# Patient Record
Sex: FEMALE | Race: WHITE | NOT HISPANIC OR LATINO | Employment: OTHER | URBAN - METROPOLITAN AREA
[De-identification: names, ages, dates, MRNs, and addresses within clinical notes are randomized per-mention and may not be internally consistent; named-entity substitution may affect disease eponyms.]

---

## 2017-02-09 LAB
EXT 24 HR UR METANEPHRINE: ABNORMAL
EXT 24 HR UR NORMETANEPHRINE: ABNORMAL
EXT 24 HR UR NORMETANEPHRINE: ABNORMAL
EXT 25 HYDROXY VIT D2: <4
EXT 25 HYDROXY VIT D3: ABNORMAL
EXT 5 HIAA 24 HR URINE: ABNORMAL
EXT 5 HIAA BLOOD: 25 NG/ML (ref 0–22)
EXT ACTH: ABNORMAL
EXT AFP: ABNORMAL
EXT ALBUMIN: ABNORMAL
EXT ALKALINE PHOSPHATASE: ABNORMAL
EXT ALT: ABNORMAL
EXT AMYLASE: ABNORMAL
EXT ANTI ISLET CELL AB: ABNORMAL
EXT ANTI PARIETAL CELL AB: ABNORMAL
EXT ANTI THYROID AB: ABNORMAL
EXT AST: ABNORMAL
EXT BILIRUBIN DIRECT: ABNORMAL MG/DL
EXT BILIRUBIN TOTAL: ABNORMAL
EXT BK VIRUS DNA QN PCR: ABNORMAL
EXT BUN: ABNORMAL
EXT C PEPTIDE: ABNORMAL
EXT CA 125: ABNORMAL
EXT CA 19-9: ABNORMAL
EXT CA 27-29: ABNORMAL
EXT CALCITONIN: ABNORMAL
EXT CALCIUM: ABNORMAL
EXT CEA: ABNORMAL
EXT CHLORIDE: ABNORMAL
EXT CHOLESTEROL: 240 (ref 140–210)
EXT CHROMOGRANIN A: 177 (ref 0–95)
EXT CO2: ABNORMAL
EXT CREATININE UA: ABNORMAL
EXT CREATININE: ABNORMAL MG/DL
EXT CYCLOSPORONE LEVEL: ABNORMAL
EXT DOPAMINE: ABNORMAL
EXT EBV DNA BY PCR: ABNORMAL
EXT EPINEPHRINE: ABNORMAL
EXT FOLATE: ABNORMAL
EXT FREE T3: ABNORMAL
EXT FREE T4: ABNORMAL
EXT FSH: ABNORMAL
EXT GASTRIN RELEASING PEPTIDE: ABNORMAL
EXT GASTRIN RELEASING PEPTIDE: ABNORMAL
EXT GASTRIN: ABNORMAL
EXT GGT: ABNORMAL
EXT GHRELIN: ABNORMAL
EXT GLUCAGON: ABNORMAL
EXT GLUCOSE: ABNORMAL
EXT GROWTH HORMONE: ABNORMAL
EXT HCV RNA QUANT PCR: ABNORMAL
EXT HDL: 62
EXT HEMATOCRIT: ABNORMAL
EXT HEMOGLOBIN A1C: ABNORMAL
EXT HEMOGLOBIN: ABNORMAL
EXT HISTAMINE 24 HR URINE: ABNORMAL
EXT HISTAMINE: ABNORMAL
EXT IGF-1: ABNORMAL
EXT IMMUNKNOW (NON-STIMULATED): ABNORMAL
EXT IMMUNKNOW (STIMULATED): ABNORMAL
EXT INR: ABNORMAL
EXT INSULIN: ABNORMAL
EXT LANREOTIDE LEVEL: ABNORMAL
EXT LDH, TOTAL: 204 (ref 81–234)
EXT LDL CHOLESTEROL: 151.8 (ref 0–100)
EXT LIPASE: ABNORMAL
EXT MAGNESIUM: 2.1 (ref 1.8–2.4)
EXT METANEPHRINE FREE PLASMA: ABNORMAL
EXT MOTILIN: ABNORMAL
EXT NEUROKININ A CAMB: ABNORMAL
EXT NEUROKININ A ISI: 20
EXT NEUROTENSIN: ABNORMAL
EXT NOREPINEPHRINE: ABNORMAL
EXT NORMETANEPHRINE: ABNORMAL
EXT NSE: ABNORMAL
EXT OCTREOTIDE LEVEL: ABNORMAL
EXT PANCREASTATIN CAMB: ABNORMAL
EXT PANCREASTATIN ISI: 70 (ref 0–88)
EXT PANCREATIC POLYPEPTIDE: ABNORMAL
EXT PHOSPHORUS: ABNORMAL
EXT PLATELETS: ABNORMAL
EXT POTASSIUM: ABNORMAL
EXT PROGRAF LEVEL: ABNORMAL
EXT PROLACTIN: ABNORMAL
EXT PROTEIN TOTAL: ABNORMAL
EXT PROTEIN UA: ABNORMAL
EXT PT: ABNORMAL
EXT PTH, INTACT: ABNORMAL
EXT PTT: ABNORMAL
EXT RAPAMUNE LEVEL: ABNORMAL
EXT SEROTONIN: 655 (ref 56–244)
EXT SODIUM: ABNORMAL MMOL/L
EXT SOMATOSTATIN: ABNORMAL
EXT SUBSTANCE P: ABNORMAL
EXT TRIGLYCERIDES: 131 (ref 35–160)
EXT TRYPTASE: ABNORMAL
EXT TSH: ABNORMAL
EXT URIC ACID: ABNORMAL
EXT URINE AMYLASE U/HR: ABNORMAL
EXT URINE AMYLASE U/L: ABNORMAL
EXT VASOACTIVE INTESTINAL POLYPEPTIDE: ABNORMAL
EXT VITAMIN B12: ABNORMAL
EXT VMA 24 HR URINE: ABNORMAL
EXT WBC: ABNORMAL
NEURON SPECIFIC ENOLASE: ABNORMAL

## 2017-04-03 LAB
EXT 24 HR UR METANEPHRINE: ABNORMAL
EXT 24 HR UR NORMETANEPHRINE: ABNORMAL
EXT 24 HR UR NORMETANEPHRINE: ABNORMAL
EXT 25 HYDROXY VIT D2: ABNORMAL
EXT 25 HYDROXY VIT D3: ABNORMAL
EXT 5 HIAA 24 HR URINE: ABNORMAL
EXT 5 HIAA BLOOD: ABNORMAL
EXT ACTH: ABNORMAL
EXT AFP: ABNORMAL
EXT ALBUMIN: 3.8 (ref 3.4–5)
EXT ALKALINE PHOSPHATASE: 82 (ref 46–116)
EXT ALT: 35 (ref 14–59)
EXT AMYLASE: ABNORMAL
EXT ANTI ISLET CELL AB: ABNORMAL
EXT ANTI PARIETAL CELL AB: ABNORMAL
EXT ANTI THYROID AB: ABNORMAL
EXT AST: 23 (ref 15–37)
EXT BILIRUBIN DIRECT: ABNORMAL MG/DL
EXT BILIRUBIN TOTAL: 0.3 (ref 0.2–1)
EXT BK VIRUS DNA QN PCR: ABNORMAL
EXT BUN: 13 (ref 7–18)
EXT C PEPTIDE: ABNORMAL
EXT CA 125: ABNORMAL
EXT CA 19-9: ABNORMAL
EXT CA 27-29: ABNORMAL
EXT CALCITONIN: ABNORMAL
EXT CALCIUM: 8.8 (ref 8.5–10.1)
EXT CEA: ABNORMAL
EXT CHLORIDE: 104 (ref 98–107)
EXT CHOLESTEROL: ABNORMAL
EXT CHROMOGRANIN A: ABNORMAL
EXT CO2: 27 (ref 21–32)
EXT CREATININE UA: ABNORMAL
EXT CREATININE: 1 (ref 0.6–1.3)
EXT CYCLOSPORONE LEVEL: ABNORMAL
EXT DOPAMINE: ABNORMAL
EXT EBV DNA BY PCR: ABNORMAL
EXT EPINEPHRINE: ABNORMAL
EXT FOLATE: ABNORMAL
EXT FREE T3: 33 (ref 22.5–37)
EXT FREE T4: 9.3 (ref 4.5–10.9)
EXT FSH: ABNORMAL
EXT GASTRIN RELEASING PEPTIDE: ABNORMAL
EXT GASTRIN RELEASING PEPTIDE: ABNORMAL
EXT GASTRIN: ABNORMAL
EXT GGT: ABNORMAL
EXT GHRELIN: ABNORMAL
EXT GLUCAGON: ABNORMAL
EXT GLUCOSE: 71 (ref 74–106)
EXT GROWTH HORMONE: ABNORMAL
EXT HCV RNA QUANT PCR: ABNORMAL
EXT HDL: ABNORMAL
EXT HEMATOCRIT: 39.5 (ref 37–47)
EXT HEMOGLOBIN A1C: ABNORMAL
EXT HEMOGLOBIN: 13.4 (ref 12–16)
EXT HISTAMINE 24 HR URINE: ABNORMAL
EXT HISTAMINE: ABNORMAL
EXT IGF-1: ABNORMAL
EXT IMMUNKNOW (NON-STIMULATED): ABNORMAL
EXT IMMUNKNOW (STIMULATED): ABNORMAL
EXT INR: ABNORMAL
EXT INSULIN: ABNORMAL
EXT LANREOTIDE LEVEL: ABNORMAL
EXT LDH, TOTAL: ABNORMAL
EXT LDL CHOLESTEROL: ABNORMAL
EXT LIPASE: ABNORMAL
EXT MAGNESIUM: ABNORMAL
EXT METANEPHRINE FREE PLASMA: ABNORMAL
EXT MOTILIN: ABNORMAL
EXT NEUROKININ A CAMB: ABNORMAL
EXT NEUROKININ A ISI: ABNORMAL
EXT NEUROTENSIN: ABNORMAL
EXT NOREPINEPHRINE: ABNORMAL
EXT NORMETANEPHRINE: ABNORMAL
EXT NSE: ABNORMAL
EXT OCTREOTIDE LEVEL: ABNORMAL
EXT PANCREASTATIN CAMB: ABNORMAL
EXT PANCREASTATIN ISI: ABNORMAL
EXT PANCREATIC POLYPEPTIDE: ABNORMAL
EXT PHOSPHORUS: ABNORMAL
EXT PLATELETS: 260 (ref 130–400)
EXT POTASSIUM: 3.8 (ref 3.5–5.1)
EXT PROGRAF LEVEL: ABNORMAL
EXT PROLACTIN: ABNORMAL
EXT PROTEIN TOTAL: 7.5 (ref 6.4–8.2)
EXT PROTEIN UA: ABNORMAL
EXT PT: ABNORMAL
EXT PTH, INTACT: ABNORMAL
EXT PTT: ABNORMAL
EXT RAPAMUNE LEVEL: ABNORMAL
EXT SEROTONIN: ABNORMAL
EXT SODIUM: 141 (ref 136–145)
EXT SOMATOSTATIN: ABNORMAL
EXT SUBSTANCE P: ABNORMAL
EXT TRIGLYCERIDES: ABNORMAL
EXT TRYPTASE: ABNORMAL
EXT TSH: 0.02 (ref 0.55–4.78)
EXT URIC ACID: ABNORMAL
EXT URINE AMYLASE U/HR: ABNORMAL
EXT URINE AMYLASE U/L: ABNORMAL
EXT VASOACTIVE INTESTINAL POLYPEPTIDE: ABNORMAL
EXT VITAMIN B12: 396 (ref 211–911)
EXT VMA 24 HR URINE: ABNORMAL
EXT WBC: 4.5 (ref 4.8–10.8)
NEURON SPECIFIC ENOLASE: ABNORMAL

## 2017-05-08 ENCOUNTER — TELEPHONE (OUTPATIENT)
Dept: NEUROLOGY | Facility: HOSPITAL | Age: 57
End: 2017-05-08

## 2017-05-08 DIAGNOSIS — C7A.012 MALIGNANT CARCINOID TUMOR OF THE ILEUM: Primary | ICD-10-CM

## 2017-05-08 RX ORDER — THYROID 90 MG/1
90 TABLET ORAL DAILY
COMMUNITY

## 2017-05-08 RX ORDER — PROGESTERONE 200 MG/1
400 CAPSULE ORAL NIGHTLY
COMMUNITY

## 2017-05-08 NOTE — TELEPHONE ENCOUNTER
----- Message from Juli Yates sent at 5/8/2017  9:52 AM CDT -----  Contact: Patient  EAW/New patient - Who called: patient    Referred by: Dr. Domingo    Why do you need to be seen? Neuroendocrine tumors- Liver    Which doctor are you requesting? Hal for surgery    You may contact patient back to schedule at:  [phone] ( Patient states Dr. Ramires office faxed information over last Thursday and [facility] will be mailing a package of the patients CDs and history)    Instructed patient to gather medical records, Cd's and medication list and fax or mail to us asap.

## 2017-05-08 NOTE — LETTER
May 8, 2017    Krysten Muhammad  [address]  [facility] [address]             [facility]  Tumor Program  [address]  [address]  [place] [address]  Phone: [phone]  Fax: [phone] Dear Ms. Muhammad:    Thank you for your interest in our program. It was my pleasure speaking with you today about your upcoming appointment.     You have a scheduled appointment with Dr CIARA Hong on Firday, May 26, 2017 at 12:30 PM. Our office is located at :    [facility]  Medical Office [facility]  Neuroendocrine Tumor Clinic  [address], [address]  [place], [address]    You are scheduled for a consultation only with the physicians unless otherwise planned. Plan to be here for your visit for 2-3 hrs. If flight arrangements are made, plan to make the return flight after 6 pm if possible. The [place] ([facility] is only 10 minutes from [facility].    In preparation for your appointment, we ask that you gather the information below and fax them to us ASAP. This will enable us to review all pertinent information at the time of your visit so that recommendations can be made and a plan of care developed for you.     Please return forms along with all paper records via fax asap.    Please fax  1. Insurance cards (front and back)-enlarged if possible  2. Drivers licenses  3. Current medicine list  4. Name, address & phone # of your physician for correspondence  5. Authorization for Release of Information-complete and return to clinic  6. Medical Records-send as soon as you have them together (see guidelines below)    Lab Work: If requested, the special lab markers do require special tubes that have to be ordered by the ordering facility. The lab work should be within 3 months.. The labs take 2-3 weeks to get results so please get labs drawn asap. The name and phone # of the send out lab that does the special labs tests is on the order. You can have the labs drawn at [facility], a local hospital, or your doctors office (whichever works). If these lab tests need to be done, I will attach an Outpatient Order form. If you are doing your lab work at a facility other than [facility], call our office to notify us the date you have them drawn and the location and phone number of the lab for easy follow up.    Scans: Please mail copies of CD's of your last scans to the office asap. I would recommend sending them overnight with a tracking number in case of any problems. If you need updated scans, I will attach an Outpatient Order form.    Tissue Biopsy/Pathology: If you had a tissue biopsy or surgery, we will request to have your slides and/or tissue blocks sent to us to perform some special testing on them. Please provide us with a pathology report asa. This testing will be billed to your insurance company.     Operative or Procedure reports: All surgery or procedure reports related to your neuroendocrine tumor should be sent to us.    Insurance Company: You should contact your insurance company to inquire about your insurance coverage and benefits. Ask about co-payments and deductibles when seeing a specialist. Ask if this visit will be in Network or Out of Network. We may be able to work with you if this is out of network for you.    Lodging: Attached is lodging information from the [email or web address] and a list of local hotels. The [facility] is run by the [employer] and is free of charge. [facility], you must call my office and talk to [facility]. You will need to complete the application and send it to my office for a physician signature. We will forward it to the [facility] and they will check availability. If you wish to stay at the [facility], apply EARLY, they fill up quickly. You may contact the [facility] a week later to confirm your reservation and ask any questions regarding the facility. You  May only stay at the [facility] 24 hrs prior to your appointment  and up to 24 hrs after your appointment. (THIS CAN ONLY BE USED IF YOU LIVE MORE THAN 40 MILES FROM OUR FACILITY).    Call me if you have any questions, email is not the best way to communicate with our office.    We are looking forward to meeting and taking care of you. If you have any questions or concerns, please don't hesitate to call.     Sincerely,        Kristel Quezada, RN, BSN  Nurse Manager, Neuroendocrine Tumor Program

## 2017-05-08 NOTE — TELEPHONE ENCOUNTER
----- Message from Juli Yates sent at 5/4/2017 12:03 PM CDT -----  Contact: New Patient  EAW/New patient - Who called:Patient    Referred by: Dr. Domingo    Why do you need to be seen?  Neuroendocrine Mid gut and Liver    Which doctor are you requesting? Hal    You may contact patient back to schedule at: [phone]    Instructed patient to gather medical records, Cd's and medication list and fax or mail to us asap.

## 2017-05-08 NOTE — TELEPHONE ENCOUNTER
Ordered tumor markers and path re read per protocol. Appointment made with MD, info sent to patient. Diagnosed with ileal and liver, had resections in 2012 and 2013 and now with recurrence in liver and wants to have surgery right away.

## 2017-05-08 NOTE — LETTER
May 8, 2017        Marc Domingo MD  [address]   [address]  [facility] [address]             [facility]  [address] [place] [address]  Phone: [phone]  Fax: [phone]   Patient: Krysten Muhammad   MR Number: [id number]   YOB: 1960   Date of Visit: 5/8/2017     Dear Dr. Domingo,     We contacted Ms. Muhammad  regarding setting up an appointment for an evaluation at our center.  We scheduled blood tumor markers and a pathology re read over the next couple of weeks.  We also scheduled an appointment with Dr. CIARA Hong on Friday, May 25, 2017 at 12:30 PM for recommendations.  We will forward you a copy of the clinic note after the visit.      Thank you for considering our program and for referring this patient.  If you have any questions, please do not hesitate to contact us.      Sincerely,      Graciela Mondragon RN

## 2017-05-09 LAB
EXT 24 HR UR METANEPHRINE: ABNORMAL
EXT 24 HR UR NORMETANEPHRINE: ABNORMAL
EXT 24 HR UR NORMETANEPHRINE: ABNORMAL
EXT 25 HYDROXY VIT D2: ABNORMAL
EXT 25 HYDROXY VIT D3: ABNORMAL
EXT 5 HIAA 24 HR URINE: ABNORMAL
EXT 5 HIAA BLOOD: ABNORMAL
EXT ACTH: ABNORMAL
EXT AFP: ABNORMAL
EXT ALBUMIN: ABNORMAL
EXT ALKALINE PHOSPHATASE: ABNORMAL
EXT ALT: ABNORMAL
EXT AMYLASE: ABNORMAL
EXT ANTI ISLET CELL AB: ABNORMAL
EXT ANTI PARIETAL CELL AB: ABNORMAL
EXT ANTI THYROID AB: ABNORMAL
EXT AST: ABNORMAL
EXT BILIRUBIN DIRECT: ABNORMAL MG/DL
EXT BILIRUBIN TOTAL: ABNORMAL
EXT BK VIRUS DNA QN PCR: ABNORMAL
EXT BUN: ABNORMAL
EXT C PEPTIDE: ABNORMAL
EXT CA 125: ABNORMAL
EXT CA 19-9: ABNORMAL
EXT CA 27-29: ABNORMAL
EXT CALCITONIN: ABNORMAL
EXT CALCIUM: ABNORMAL
EXT CEA: ABNORMAL
EXT CHLORIDE: ABNORMAL
EXT CHOLESTEROL: ABNORMAL
EXT CHROMOGRANIN A: 270 NG/ML (ref 0–95)
EXT CO2: ABNORMAL
EXT CREATININE UA: ABNORMAL
EXT CREATININE: ABNORMAL MG/DL
EXT CYCLOSPORONE LEVEL: ABNORMAL
EXT DOPAMINE: ABNORMAL
EXT EBV DNA BY PCR: ABNORMAL
EXT EPINEPHRINE: ABNORMAL
EXT FOLATE: ABNORMAL
EXT FREE T3: ABNORMAL
EXT FREE T4: ABNORMAL
EXT FSH: ABNORMAL
EXT GASTRIN RELEASING PEPTIDE: ABNORMAL
EXT GASTRIN RELEASING PEPTIDE: ABNORMAL
EXT GASTRIN: ABNORMAL
EXT GGT: ABNORMAL
EXT GHRELIN: ABNORMAL
EXT GLUCAGON: ABNORMAL
EXT GLUCOSE: ABNORMAL
EXT GROWTH HORMONE: ABNORMAL
EXT HCV RNA QUANT PCR: ABNORMAL
EXT HDL: ABNORMAL
EXT HEMATOCRIT: ABNORMAL
EXT HEMOGLOBIN A1C: ABNORMAL
EXT HEMOGLOBIN: ABNORMAL
EXT HISTAMINE 24 HR URINE: ABNORMAL
EXT HISTAMINE: ABNORMAL
EXT IGF-1: ABNORMAL
EXT IMMUNKNOW (NON-STIMULATED): ABNORMAL
EXT IMMUNKNOW (STIMULATED): ABNORMAL
EXT INR: ABNORMAL
EXT INSULIN: ABNORMAL
EXT LANREOTIDE LEVEL: ABNORMAL
EXT LDH, TOTAL: ABNORMAL
EXT LDL CHOLESTEROL: ABNORMAL
EXT LIPASE: ABNORMAL
EXT MAGNESIUM: ABNORMAL
EXT METANEPHRINE FREE PLASMA: ABNORMAL
EXT MOTILIN: ABNORMAL
EXT NEUROKININ A CAMB: ABNORMAL
EXT NEUROKININ A ISI: 15 PG/ML (ref 0–40)
EXT NEUROTENSIN: ABNORMAL
EXT NOREPINEPHRINE: ABNORMAL
EXT NORMETANEPHRINE: ABNORMAL
EXT NSE: ABNORMAL
EXT OCTREOTIDE LEVEL: ABNORMAL
EXT PANCREASTATIN CAMB: ABNORMAL
EXT PANCREASTATIN ISI: ABNORMAL
EXT PANCREATIC POLYPEPTIDE: ABNORMAL
EXT PHOSPHORUS: ABNORMAL
EXT PLATELETS: ABNORMAL
EXT POTASSIUM: ABNORMAL
EXT PROGRAF LEVEL: ABNORMAL
EXT PROLACTIN: ABNORMAL
EXT PROTEIN TOTAL: ABNORMAL
EXT PROTEIN UA: ABNORMAL
EXT PT: ABNORMAL
EXT PTH, INTACT: ABNORMAL
EXT PTT: ABNORMAL
EXT RAPAMUNE LEVEL: ABNORMAL
EXT SEROTONIN: 817 NG/ML (ref 56–244)
EXT SODIUM: ABNORMAL MMOL/L
EXT SOMATOSTATIN: ABNORMAL
EXT SUBSTANCE P: ABNORMAL
EXT TRIGLYCERIDES: ABNORMAL
EXT TRYPTASE: ABNORMAL
EXT TSH: ABNORMAL
EXT URIC ACID: ABNORMAL
EXT URINE AMYLASE U/HR: ABNORMAL
EXT URINE AMYLASE U/L: ABNORMAL
EXT VASOACTIVE INTESTINAL POLYPEPTIDE: ABNORMAL
EXT VITAMIN B12: ABNORMAL
EXT VMA 24 HR URINE: ABNORMAL
EXT WBC: ABNORMAL
NEURON SPECIFIC ENOLASE: ABNORMAL

## 2017-05-11 ENCOUNTER — TELEPHONE (OUTPATIENT)
Dept: NEUROLOGY | Facility: HOSPITAL | Age: 57
End: 2017-05-11

## 2017-05-11 LAB
EXT 24 HR UR METANEPHRINE: ABNORMAL
EXT 24 HR UR NORMETANEPHRINE: ABNORMAL
EXT 24 HR UR NORMETANEPHRINE: ABNORMAL
EXT 25 HYDROXY VIT D2: ABNORMAL
EXT 25 HYDROXY VIT D3: ABNORMAL
EXT 5 HIAA 24 HR URINE: ABNORMAL
EXT 5 HIAA BLOOD: 57 (ref 0–22)
EXT ACTH: ABNORMAL
EXT AFP: ABNORMAL
EXT ALBUMIN: ABNORMAL
EXT ALKALINE PHOSPHATASE: ABNORMAL
EXT ALT: ABNORMAL
EXT AMYLASE: ABNORMAL
EXT ANTI ISLET CELL AB: ABNORMAL
EXT ANTI PARIETAL CELL AB: ABNORMAL
EXT ANTI THYROID AB: ABNORMAL
EXT AST: ABNORMAL
EXT BILIRUBIN DIRECT: ABNORMAL MG/DL
EXT BILIRUBIN TOTAL: ABNORMAL
EXT BK VIRUS DNA QN PCR: ABNORMAL
EXT BUN: ABNORMAL
EXT C PEPTIDE: ABNORMAL
EXT CA 125: ABNORMAL
EXT CA 19-9: ABNORMAL
EXT CA 27-29: ABNORMAL
EXT CALCITONIN: ABNORMAL
EXT CALCIUM: ABNORMAL
EXT CEA: ABNORMAL
EXT CHLORIDE: ABNORMAL
EXT CHOLESTEROL: ABNORMAL
EXT CHROMOGRANIN A: ABNORMAL
EXT CO2: ABNORMAL
EXT CREATININE UA: ABNORMAL
EXT CREATININE: ABNORMAL MG/DL
EXT CYCLOSPORONE LEVEL: ABNORMAL
EXT DOPAMINE: ABNORMAL
EXT EBV DNA BY PCR: ABNORMAL
EXT EPINEPHRINE: ABNORMAL
EXT FOLATE: ABNORMAL
EXT FREE T3: ABNORMAL
EXT FREE T4: ABNORMAL
EXT FSH: ABNORMAL
EXT GASTRIN RELEASING PEPTIDE: ABNORMAL
EXT GASTRIN RELEASING PEPTIDE: ABNORMAL
EXT GASTRIN: ABNORMAL
EXT GGT: ABNORMAL
EXT GHRELIN: ABNORMAL
EXT GLUCAGON: ABNORMAL
EXT GLUCOSE: ABNORMAL
EXT GROWTH HORMONE: ABNORMAL
EXT HCV RNA QUANT PCR: ABNORMAL
EXT HDL: ABNORMAL
EXT HEMATOCRIT: ABNORMAL
EXT HEMOGLOBIN A1C: ABNORMAL
EXT HEMOGLOBIN: ABNORMAL
EXT HISTAMINE 24 HR URINE: ABNORMAL
EXT HISTAMINE: ABNORMAL
EXT IGF-1: ABNORMAL
EXT IMMUNKNOW (NON-STIMULATED): ABNORMAL
EXT IMMUNKNOW (STIMULATED): ABNORMAL
EXT INR: ABNORMAL
EXT INSULIN: ABNORMAL
EXT LANREOTIDE LEVEL: ABNORMAL
EXT LDH, TOTAL: ABNORMAL
EXT LDL CHOLESTEROL: ABNORMAL
EXT LIPASE: ABNORMAL
EXT MAGNESIUM: ABNORMAL
EXT METANEPHRINE FREE PLASMA: ABNORMAL
EXT MOTILIN: ABNORMAL
EXT NEUROKININ A CAMB: ABNORMAL
EXT NEUROKININ A ISI: ABNORMAL
EXT NEUROTENSIN: ABNORMAL
EXT NOREPINEPHRINE: ABNORMAL
EXT NORMETANEPHRINE: ABNORMAL
EXT NSE: ABNORMAL
EXT OCTREOTIDE LEVEL: ABNORMAL
EXT PANCREASTATIN CAMB: ABNORMAL
EXT PANCREASTATIN ISI: ABNORMAL
EXT PANCREATIC POLYPEPTIDE: ABNORMAL
EXT PHOSPHORUS: ABNORMAL
EXT PLATELETS: ABNORMAL
EXT POTASSIUM: ABNORMAL
EXT PROGRAF LEVEL: ABNORMAL
EXT PROLACTIN: ABNORMAL
EXT PROTEIN TOTAL: ABNORMAL
EXT PROTEIN UA: ABNORMAL
EXT PT: ABNORMAL
EXT PTH, INTACT: ABNORMAL
EXT PTT: ABNORMAL
EXT RAPAMUNE LEVEL: ABNORMAL
EXT SEROTONIN: ABNORMAL
EXT SODIUM: ABNORMAL MMOL/L
EXT SOMATOSTATIN: ABNORMAL
EXT SUBSTANCE P: ABNORMAL
EXT TRIGLYCERIDES: ABNORMAL
EXT TRYPTASE: ABNORMAL
EXT TSH: ABNORMAL
EXT URIC ACID: ABNORMAL
EXT URINE AMYLASE U/HR: ABNORMAL
EXT URINE AMYLASE U/L: ABNORMAL
EXT VASOACTIVE INTESTINAL POLYPEPTIDE: ABNORMAL
EXT VITAMIN B12: ABNORMAL
EXT VMA 24 HR URINE: ABNORMAL
EXT WBC: ABNORMAL
NEURON SPECIFIC ENOLASE: ABNORMAL

## 2017-05-11 NOTE — TELEPHONE ENCOUNTER
Returned call to patient and she would like Dr Hong to review her films and try to determine if she might be a surgical candidate before her appointments on May 29 and May 30 so that she can book her daughter's flight form [place] because it is cheaper when they are booked earlier.  Will relay the massage to Dr Hong to review.

## 2017-05-11 NOTE — TELEPHONE ENCOUNTER
----- Message from Juli Yates sent at 5/11/2017  3:27 PM CDT -----  Contact: Patient  EAW/NEW- Patient is calling to make sure you received all scans needed. Also she would like to be placed on a wait list  to get in sooner with the doctors. Patient can be reached at [phone].

## 2017-05-15 ENCOUNTER — TELEPHONE (OUTPATIENT)
Dept: NEUROLOGY | Facility: HOSPITAL | Age: 57
End: 2017-05-15

## 2017-05-17 ENCOUNTER — TELEPHONE (OUTPATIENT)
Dept: NEUROLOGY | Facility: HOSPITAL | Age: 57
End: 2017-05-17

## 2017-05-17 DIAGNOSIS — C7B.02 SECONDARY MALIGNANT CARCINOID TUMOR OF LIVER: Primary | ICD-10-CM

## 2017-05-17 NOTE — TELEPHONE ENCOUNTER
Returned pts calll to inform that Dr. Hong has not yet reviewed her scans, but we will have him review as soon as he possibly can and return call. Pt verbalizes understanding.

## 2017-05-17 NOTE — TELEPHONE ENCOUNTER
----- Message from Naye Rivas sent at 5/15/2017 12:10 PM CDT -----  Contact: Patient  JPB- patient called regarding a fax to be seen by Dr. Hong. Patient would like to speak to Kim. Patients call back number [phone].

## 2017-05-17 NOTE — TELEPHONE ENCOUNTER
----- Message from Naye Rivas sent at 5/17/2017 10:46 AM CDT -----  Contact: Patient  JPB- Patient called regarding her surgery she would like to confirm that the surgery is a go. Patient would like to speak to Kim regarding this appointmen.

## 2017-05-18 NOTE — TELEPHONE ENCOUNTER
Phoned patient after Dr Hong viewed her films and said that surgery is on for Monday, June 19, 2017.  Will email her a screen shot of her CT scan time and anesthesia preop time.

## 2017-05-26 ENCOUNTER — LAB VISIT (OUTPATIENT)
Dept: LAB | Facility: HOSPITAL | Age: 57
End: 2017-05-26
Attending: SURGERY
Payer: MEDICARE

## 2017-05-26 DIAGNOSIS — C7A.012 MALIGNANT CARCINOID TUMOR OF THE ILEUM: ICD-10-CM

## 2017-05-26 PROCEDURE — 88360 TUMOR IMMUNOHISTOCHEM/MANUAL: CPT

## 2017-05-26 PROCEDURE — 88323 CONSLTJ&REPRT MATRL PREP SLD: CPT

## 2017-05-26 PROCEDURE — 88323 CONSLTJ&REPRT MATRL PREP SLD: CPT | Mod: 26,,,

## 2017-05-26 PROCEDURE — 88360 TUMOR IMMUNOHISTOCHEM/MANUAL: CPT | Mod: 26,59,,

## 2017-05-29 ENCOUNTER — HOSPITAL ENCOUNTER (OUTPATIENT)
Dept: CARDIOLOGY | Facility: HOSPITAL | Age: 57
Discharge: HOME | End: 2017-05-29
Attending: SURGERY
Payer: MEDICARE

## 2017-05-29 ENCOUNTER — HOSPITAL ENCOUNTER (OUTPATIENT)
Dept: RADIOLOGY | Facility: HOSPITAL | Age: 57
Discharge: HOME | End: 2017-05-29
Attending: SURGERY
Payer: MEDICARE

## 2017-05-29 ENCOUNTER — OFFICE VISIT (OUTPATIENT)
Dept: NEUROLOGY | Facility: HOSPITAL | Age: 57
End: 2017-05-29
Attending: SURGERY
Payer: MEDICARE

## 2017-05-29 VITALS
BODY MASS INDEX: 27.46 KG/M2 | WEIGHT: 155 LBS | TEMPERATURE: 97 F | HEART RATE: 69 BPM | SYSTOLIC BLOOD PRESSURE: 105 MMHG | DIASTOLIC BLOOD PRESSURE: 71 MMHG | HEIGHT: 63 IN

## 2017-05-29 DIAGNOSIS — C7B.02 METASTATIC MALIGNANT CARCINOID TUMOR TO LIVER: ICD-10-CM

## 2017-05-29 DIAGNOSIS — C7B.01 SECONDARY CARCINOID TUMOR OF DISTANT LYMPH NODES: ICD-10-CM

## 2017-05-29 DIAGNOSIS — C7A.012 MALIGNANT CARCINOID TUMOR OF THE ILEUM: ICD-10-CM

## 2017-05-29 DIAGNOSIS — C7B.02 SECONDARY MALIGNANT CARCINOID TUMOR OF LIVER: ICD-10-CM

## 2017-05-29 LAB
DIASTOLIC DYSFUNCTION: NO
MITRAL VALVE MOBILITY: NORMAL
RETIRED EF AND QEF - SEE NOTES: 65 (ref 55–65)

## 2017-05-29 PROCEDURE — 25500020 PHARM REV CODE 255: Performed by: SURGERY

## 2017-05-29 PROCEDURE — 93306 TTE W/DOPPLER COMPLETE: CPT | Mod: 26,,, | Performed by: INTERNAL MEDICINE

## 2017-05-29 PROCEDURE — 74177 CT ABD & PELVIS W/CONTRAST: CPT | Mod: TC

## 2017-05-29 PROCEDURE — 93306 TTE W/DOPPLER COMPLETE: CPT

## 2017-05-29 PROCEDURE — 74177 CT ABD & PELVIS W/CONTRAST: CPT | Mod: 26,,, | Performed by: RADIOLOGY

## 2017-05-29 RX ORDER — MIRABEGRON 50 MG/1
TABLET, FILM COATED, EXTENDED RELEASE ORAL
COMMUNITY
Start: 2017-05-24

## 2017-05-29 RX ORDER — ESTRADIOL 1 MG/1
1 TABLET ORAL DAILY
COMMUNITY

## 2017-05-29 RX ADMIN — IOHEXOL 30 ML: 350 INJECTION, SOLUTION INTRAVENOUS at 01:05

## 2017-05-29 RX ADMIN — IOHEXOL 75 ML: 350 INJECTION, SOLUTION INTRAVENOUS at 03:05

## 2017-05-29 NOTE — LETTER
May 29, 2017        Marc Domingo MD  [address]   [address]  [facility] [address]       NOLANETS: [facility] Neuroendocrine Tumor [facility]  A collaboration between [facility] and [facility] [address]  [address]  [place] [address]  Phone: [phone]  Fax: [phone]        CIARA Hong M.D., FACS  Kun Alcazar M.D.  Jordan Pizano M.D.   Cailin Denson M.D., FACS  DO Thad Maher M.D., FACS   Patient: Krysten Muhammad   MR Number: [id number]   YOB: 1960   Date of Visit: 5/29/2017     Dear Dr. Domingo    Thank you for the kind referral of Krysten Muhammad. We saw this patient on 5/29/2017, and a copy of our clinic note is enclosed. We certainly appreciate the opportunity to participate in your patient's care.     If you have any questions or wish to discuss your patient further, please do not hesitate to contact us at [phone].       Kindest personal regards,          Thad White MD, FACS  The [employer] Professor of Surgery & Neurosciences  [facility], Dept. Of Surgery  [address] [place] [address], [address]  [place] [address] [phone]

## 2017-05-29 NOTE — PATIENT INSTRUCTIONS
See Dr Hong tomorrow regarding surgery options at 10:30 AM    Labs every 3 months-orders given to patient. If surgery is done then do them 2 months after surgery and get us the results    CT, MRI prior to returning to clinic-call [phone] to schedule, if surgery is done then have them done 3 months after surgery to be re-staged    Echocardiogram yearly    Return to clinic in 6 months-if surgery is done then see Dr White 3 months after surgery

## 2017-05-29 NOTE — PROGRESS NOTES
"NOLANETS:  [facility] Neuroendocrine Tumor Special[facility]  A collaboration between [facility] and [facility]    PATIENT: Krysten Muhmamad  MRN: [id number]  DATE: 5/29/2017    Vitals:    05/29/17 0820   BP: 105/71   Pulse: 69   Temp: 97.4 °F (36.3 °C)   TempSrc: Oral   Weight: 70.3 kg (155 lb)   Height: 5' 3" (1.6 m)      Last 2 Weight Measurements:   Wt Readings from Last 2 Encounters:   05/29/17 70.3 kg (155 lb)     BMI: Body mass index is 27.46 kg/m².    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 10/2012    Secondary neuroendocrine tumor of distant lymph nodes 2012       Past Surgical History:   Procedure Laterality Date    breast reduction  and implants  2012    SBR, liver r/s, gall bladder  07/2013    Ki 67- 2%    SBR, lymph node r/s  10/2012    found carcinoid with colon obstruction       Review of patient's allergies indicates:   Allergen Reactions    Ciprofloxacin Other (See Comments)     Nausea and joint pain      Dilaudid [hydromorphone (bulk)] Nausea Only    Epinephrine Other (See Comments)     Carcinoid patient    Levaquin [levofloxacin] Other (See Comments)     Nausea and joint pain    Morphine Nausea And Vomiting    Ciprofloxacin hcl     Hydromorphone Nausea Only    Quinolones        Current Outpatient Prescriptions   Medication Sig Dispense Refill    " estradiol (ESTRACE) 1 MG tablet Take 1 mg by mouth once daily.      progesterone (PROMETRIUM) 200 MG capsule Take 200 mg by mouth every evening.      thyroid, pork, (ARMOUR THYROID) 90 mg Tab Take 90 mg by mouth once daily.      MYRBETRIQ 50 mg Tb24        No current facility-administered medications for this visit.        Review of Systems     Number of Days per Week Number per Day   DIARRHEA 0    BRISTOL STOOL SCALE RATING     FLUSHING 7 10--last for < 1 minute   WHEEZING 0      WEIGHT GAIN/LOSS 155=+10 over normal   ENERGY RATING (0-10) 10      Physical Exam deferred.             Pathology Data:        Laboratory Data:  Neuroendocrine Labs Latest Ref Rng & Units 5/9/2017 4/3/2017 2/9/2017   EXT 5 HIAA BLOOD 0 - 22 ng/ml   25 (A)   EXT SEROTONIN 56.0 - 244.0 ng/ml 817 (A)  655 (A)   EXT CHROMOGRANIN A 0.0 - 95.0 ng/ml 270 (A)  177 (A)   EXT PANCREASTATIN JERE 0.0 - 88.0   70   EXT NEUROKININ A JERE 0 - 40 pg/ml 15  20         Radiology Data:                        Impression:  1. Liver mets from Ileal NET with jose elias mets and liver mets-- ki 67 is about 4%  2.  hx of hypothyroidism       Plan: see LAB and JPB tomorrow about possible resection of liver mets            needs echocardiogram and ct today       Labs: Markers: 12 month intervals            Other: 6 month intervals    Scans: 6 month intervals    Return to Clinic:6 month intervals    Orders placed this visit: No orders of the defined types were placed in this encounter.       25 Minutes Face-to-Face; Counseling/Coordination of Care > 50 percent of Visit     Thad White MD, FACS  The [employer] Professor of Surgery, [facility] Neuroendocrine Tumor Specialists  [address], [address]  [place]  [address]  Cell [phone]  ph. [phone]; [phone]  fax. [phone]  [email or web address]

## 2017-05-30 ENCOUNTER — CLINICAL SUPPORT (OUTPATIENT)
Dept: NEUROLOGY | Facility: HOSPITAL | Age: 57
End: 2017-05-30
Payer: MEDICARE

## 2017-05-30 ENCOUNTER — OFFICE VISIT (OUTPATIENT)
Dept: NEUROLOGY | Facility: HOSPITAL | Age: 57
End: 2017-05-30
Attending: SURGERY
Payer: MEDICARE

## 2017-05-30 VITALS
HEART RATE: 67 BPM | BODY MASS INDEX: 27.46 KG/M2 | WEIGHT: 155 LBS | TEMPERATURE: 98 F | DIASTOLIC BLOOD PRESSURE: 81 MMHG | HEIGHT: 63 IN | SYSTOLIC BLOOD PRESSURE: 117 MMHG

## 2017-05-30 DIAGNOSIS — C7B.01 SECONDARY CARCINOID TUMOR OF DISTANT LYMPH NODES: ICD-10-CM

## 2017-05-30 DIAGNOSIS — C7B.02 METASTATIC MALIGNANT CARCINOID TUMOR TO LIVER: Primary | ICD-10-CM

## 2017-05-30 PROCEDURE — 99212 OFFICE O/P EST SF 10 MIN: CPT | Mod: 27

## 2017-05-30 NOTE — PROGRESS NOTES
"NOLANETS:  [facility] Neuroendocrine Tumor Special[facility]  A collaboration between [facility] and [facility]      PATIENT: Krysten Muhammad  MRN: [id number]  DATE: 5/30/2017    Subjective:      Chief Complaint: Consult (liver debulking consult for surgery)      Vitals:   Vitals:    05/30/17 1005   BP: 117/81   Pulse: 67   Temp: 97.5 °F (36.4 °C)   TempSrc: Oral   Weight: 70.3 kg (155 lb)   Height: 5' 3" (1.6 m)        Karnofsky Score:     Diagnosis:   1. 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 Secondary carcinoid tumor of distant lymph nodes         Oncologic History: s/p TI carcinoid 2012/2013.      Interval History: Ms. 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 10/2012    Secondary neuroendocrine tumor of distant lymph nodes 2012       Past Surgical History:  Past Surgical History:   Procedure Laterality Date    breast reduction  and implants  2012    SBR, liver r/s, gall bladder  07/2013    Ki 67- 2%    SBR, lymph node r/s  10/2012    found carcinoid with colon obstruction       Family History:  Family History   Problem Relation Age of Onset    Carcinoid Cancer Mother        Allergies:  Review of patient's allergies indicates:   Allergen Reactions    Ciprofloxacin Other (See Comments)     Nausea and joint pain      Dilaudid [hydromorphone (bulk)] Nausea Only    Epinephrine Other (See Comments)     Carcinoid patient    Levaquin [levofloxacin] Other (See Comments)     Nausea and joint pain    Morphine Nausea And Vomiting    Ciprofloxacin hcl     Hydromorphone Nausea Only    Quinolones        Medications:  Current " Outpatient Prescriptions   Medication Sig Dispense Refill    estradiol (ESTRACE) 1 MG tablet Take 1 mg by mouth once daily.      MYRBETRIQ 50 mg Tb24       progesterone (PROMETRIUM) 200 MG capsule Take 200 mg by mouth every evening.      thyroid, pork, (ARMOUR THYROID) 90 mg Tab Take 90 mg by mouth once daily.       No current facility-administered medications for this visit.        Review of Systems   Constitutional: Positive for fatigue. Negative for activity change, appetite change, chills, fever and unexpected weight change.   HENT: Negative.  Negative for congestion, ear pain, rhinorrhea and sinus pressure.    Eyes: Negative.  Negative for photophobia, pain and redness.   Respiratory: Negative.  Negative for cough, chest tightness, shortness of breath and wheezing.    Cardiovascular: Negative for chest pain, palpitations and leg swelling.   Gastrointestinal: Negative.  Negative for abdominal distention, abdominal pain, anal bleeding, blood in stool, constipation, diarrhea, nausea, rectal pain and vomiting.   Endocrine: Negative.  Negative for cold intolerance, heat intolerance, polydipsia, polyphagia and polyuria.   Genitourinary: Negative.  Negative for difficulty urinating, dysuria and frequency.   Musculoskeletal: Negative.  Negative for arthralgias, back pain, gait problem, myalgias, neck pain and neck stiffness.   Skin: Positive for color change (flushing). Negative for pallor and rash.   Allergic/Immunologic: Negative.  Negative for environmental allergies, food allergies and immunocompromised state.   Neurological: Negative.  Negative for dizziness, seizures, syncope, weakness and light-headedness.   Hematological: Negative.  Negative for adenopathy. Does not bruise/bleed easily.   Psychiatric/Behavioral: Negative.  Negative for agitation, behavioral problems, confusion, decreased concentration, dysphoric mood, hallucinations, self-injury, sleep disturbance and suicidal ideas. The patient is not  nervous/anxious and is not hyperactive.       Objective:      Physical Exam   Constitutional: She is oriented to person, place, and time. She appears well-developed and well-nourished. No distress.   HENT:   Head: Normocephalic and atraumatic.   Mouth/Throat: Oropharynx is clear and moist.   Eyes: EOM are normal. Pupils are equal, round, and reactive to light. No scleral icterus.   Neck: Normal range of motion. Neck supple. No thyromegaly present.   Cardiovascular: Normal rate, regular rhythm, normal heart sounds and intact distal pulses.  Exam reveals no gallop.    No murmur heard.  Pulmonary/Chest: Effort normal and breath sounds normal. No respiratory distress. She has no wheezes. She has no rales.   Abdominal: Soft. Bowel sounds are normal. She exhibits no distension and no mass. There is no tenderness. There is no rebound and no guarding. No hernia. Hernia confirmed negative in the ventral area.   Well healed scar   Musculoskeletal: Normal range of motion. She exhibits no edema or tenderness.   Lymphadenopathy:        Head (right side): No submandibular adenopathy present.        Head (left side): No submandibular adenopathy present.     She has no cervical adenopathy.        Right cervical: No superficial cervical adenopathy present.       Left cervical: No superficial cervical adenopathy present.        Right axillary: No pectoral and no lateral adenopathy present.        Left axillary: No pectoral and no lateral adenopathy present.       Right: No inguinal and no supraclavicular adenopathy present.        Left: No inguinal and no supraclavicular adenopathy present.   Neurological: She is alert and oriented to person, place, and time. She has normal reflexes. No cranial nerve deficit.   Skin: Skin is warm, dry and intact. No rash noted. She is not diaphoretic. No erythema. No pallor.   Psychiatric: She has a normal mood and affect. Her behavior is normal. Thought content normal.   Nursing note and vitals  reviewed.     Assessment:       1. 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 Secondary carcinoid tumor of distant lymph nodes        Laboratory Data:    Neuroendocrine Labs Latest Ref Rng & Units 5/30/2017 5/29/2017 5/9/2017 4/3/2017 2/9/2017   EXT 5 HIAA BLOOD 0 - 22 ng/ml - - - - 25(A)   EXT SEROTONIN 56.0 - 244.0 ng/ml - - 817(A) - 655(A)   EXT CHROMOGRANIN A 0.0 - 95.0 ng/ml - - 270(A) - 177(A)   EXT PANCREASTATIN JERE 0.0 - 88.0 - - - - 70   EXT NEUROKININ A JERE 0 - 40 pg/ml - - 15 - 20   EXT VITAMIN B12 211.0 - 911.0 - - - 396 -   EXT FREE T4 4.5 - 10.9 - - - 9.3 -   EXT FREE T3 22.5 - 37.0 - - - 33.0 -   EXT TSH 0.55 - 4.78 - - - 0.023(A) -   EXT 25 HYDROXY VIT D2 - - - - - <4.0   EXT WBC 4.8 - 10.8 - - - 4.5(A) -   EXT HGB 12.0 - 16.0 - - - 13.4 -   EXT HCT 37.0 - 47.0 - - - 39.5 -   EXT PLATLETS 130.0 - 400.0 - - - 260 -   EXT GLUCOSE 74.0 - 106.0 - - - 71(A) -   EXT BUN 7.0 - 18.0 - - - 13 -   EXT CREATININE 0.6 - 1.3 - - - 1.0 -   EXT .0 - 145.0 - - - 141 -   EXT K 3.5 - 5.1 - - - 3.8 -   EXT CHLORIDE 98.0 - 107.0 - - - 104 -   EXT CO2 21.0 - 32.0 - - - 27 -   EXT CALCIUM 8.5 - 10.1 - - - 8.8 -   EXT PROTEIN, TOTAL 6.4 - 8.2 - - - 7.5 -   EXT ALBUMIN 3.4 - 5.0 - - - 3.8 -   EXT TOTAL BILIRUBIN 0.2 - 1.0 - - - 0.3 -   EXT ALK PHOSPHATASE 46.0 - 116.0 - - - 82 -   EXT SGOT (AST) 15.0 - 37.0 - - - 23 -   EXT ALT 14.0 - 59.0 - - - 35 -   EXT LDH 81.0 - 234.0 - - - - 204.0   EXT TRIGLYCERIDES 35.0 - 160.0 - - - - 131   EXT CHOLESETEROL 140.0 - 210.0 - - - - 240(A)   EXT HDL - - - - - 62   EXT LDL 0.0 - 100.0 - - - - 151.8(A)   EXT MG 1.8 - 2.4 - - - - 2.10   Weight - 155 lbs 155 lbs - - -         Impression: Familial carcinoid syndrome with liver mets  Possible lesion in spleen.  History of intolerance to higher dose Sandostatin and lanreotide.  Not on therapy at present. liver metastases show progression  Plan:        recommend low-dose lanreotide 90 mg every 6 weeks to assess  tolerability.  Scans reviewed and discussed with patient  Multiple questions  Would like tissue saved for outside studies  looks resectable  1 hour counseling     tentative or 6/19/17          CIARA Hong MD, FACS  Professor of Surgery, [facility]  Neuroendocrine Surgery, Hepatic/Pancreatic & General Surgery  [address]  [place]  [address]  ph. [phone]; [phone]  fax. [phone]

## 2017-05-30 NOTE — PATIENT INSTRUCTIONS
Patient Instructions            Take a Hibiclens shower twice a day for 3 days prior to surgery, including the morning of surgery.   Gargle with Listerine twice a day for 2 days prior to surgery, including the morning of surgery.     6/16/2017     Appointment with Dr. Hong    Pre [place] for Hospital Admission - Go to 1st floor of the hospital-admitting desk. You will do paper work, get blood drawn,  get x-rays and see Anesthesia at this time.     6/18/2017   CLEAR LIQUIDS all day   Start bowel prep  Ducolax Laxative tablets - 2 tablets at 12 noon  Magnesium Citrate - 1 bottle at 2 pm   Do not eat or drink anything after midnight.                 6/19/2017   Hospital Admission for surgery.  Report to 2nd floor, Same Day Surgery desk at _7:00am   Surgery is scheduled for 9:00am        [facility] [address]  [place]  [id number]  [phone]      INFORMATION REGARDING YOUR PROCEDURE      We look forward to serving you and your family and appreciate that you have chosen [facility] for your healthcare needs. Before, during, and after your surgery, you will be cared for by skilled medical professionals. Our surgeons, anesthesiologists, nurses,  and other healthcare professionals will work with you and your family to ensure a safe, smooth and comfortable surgery and recovery.    In order to best meet your pre-admission needs, your surgeon or ordering physicians office will contact our Scheduling Office at [phone] and schedule a Pre-Procedure Appointment.  This should preferably be done 72 hours or greater before your scheduled procedure date.     During your pre-procedure visit your insurance will be verified for your procedure. You will meet with a Registered Nurse and an Anesthesiologist or Nurse Anesthetist. If tests are required, they will be performed during your visit. Please allow about one and a half hours for this visit.      You will need to bring  the following information or items with you to your Pre-Procedure Appointment:    1.  Picture Identification  2.  Insurance Card  3.  Current list of medications to include name and dosage  4.  List of allergies  5.  Orders and any other forms your doctor has given to you  6.  Copies of lab results performed at other facilities. This may include blood work, EKGs or chest xrays.   These results can be faxed to [phone].    The Pre-Op Center Registration Desk is located on the first floor of the hospital ([address] [place] [address]) in the [facility].  The Pre-Operative Center is located on the second floor of the hospital. Someone will walk you from the registration area to the Pre-Operative Center.    Free parking is located in the front of the hospital and medical office building and is easily accessed from [address] and [address]. When you arrive, please check in at the main information desk of the hospital.    Please call Outpatient Surgery at [phone] after 12:00pm on the day before your procedure for your arrival time and updated procedure time.      Pre-Op Bathing Instructions    Before surgery, you can play an important role in your own health.    Because skin is not sterile, we need to be sure that your skin is as free of germs as possible. By following the instructions below, you can reduce the number of germs on your skin before surgery.    IMPORTANT: You will need to shower with a special soap called Hibiclens*, available at any pharmacy.  If you are allergic to Chlorhexidine (the antiseptic in Hibiclens), use an antibacterial soap such as Dial Soap for your preoperative shower.  You will shower with Hibiclens both the night before your surgery and the morning of your surgery.  Do not use Hibiclens on the head, face or genitals to avoid injury to those areas.    STEP #1: THE NIGHT BEFORE YOUR SURGERY     1. Do not shave the area of your body where your surgery will be  performed.  2. Shower and wash your hair and body as usual with your normal soap and shampoo.  3. Rinse your hair and body thoroughly after you shower to remove all soap residue.  4. With your hand, apply one packet of Hibiclens soap to the surgical site.   5. Wash the site gently for five (5) minutes. Do not scrub your skin too hard.   6. Do not wash with your regular soap after Hibiclens is used.  7. Rinse your body thoroughly.  8. Pat yourself dry with a clean, soft towel.  9. Do not use lotion, cream, or powder.  10. Wear clean clothes.    STEP #2: THE MORNING OF YOUR SURGERY     1. Repeat Step #1.    * Not to be used by people allergic to Chlorhexidine.

## 2017-05-31 NOTE — PATIENT INSTRUCTIONS
MyPlate Worksheet: 2,200 Calories  Your calorie needs are about 2,000 calories a day. Below are the U.S. Department of Agriculture (USDA) guidelines for your daily recommended amount of each food group.  Vegetables  2½ cups Fruits  2 cups Grains  8 ounces Dairy  3 cups Protein  8  ounces   Eat a variety of vegetables. Cook soft and  Avoid peels and skins.  each day.  Aim for these amounts each week:  · 1½ cups dark green vegetables  · 5½ cups red or orange- colored vegetables  · 1½ cups dry beans and peas  · 5 cups starchy vegetables  · 4 cups other vegetables Eat a variety of fruits each day.  Dilute  fruit juices with water.  Fruits should be peeled or skinned. Cooked or canned may be best.  Good choices of fruits include:  · Berries  · Bananas  · Apples  · Melon  · Frozen fruit  · Canned fruit in their own juice avoid Heavy  syrup  Choose.  Aim to eat at least 3 ounces of  each day:  · Bread  · Cereal  · Rice  · Pasta  · Potatoes  · Tortillas Choose low-fat or fat-free milk, yogurt, or cheese each day.  Good choices include:  · Low-fat or fat-free milk or chocolate milk  · Low-fat or fat-free yogurt  · Low-fat or fat-free cottage cheese or other reduced-fat cheeses  · Calcium-fortified milk alternatives Choose low-fat or lean meats, poultry, fish and seafood each day.  Vary your protein, choose more:  · Fish and other seafood  · Lean low-fat meat and poultry  · Eggs  ·   · Tofu  ·   Choose less high-fat and red meat.   Source: USDA MyPlate, www.choosemyplate.gov  Know your limits on oils (fats) and sugars:  · Your allowance for oils is 6 teaspoons a day (oil includes vegetable oil, mayonnaise, soft margarine, salad dressing, nuts, olives, avocados, and some fish).  · Limit the extras (solid fats and sugars, also called empty calories) to 260 calories a day.  · Cut back on salt (sodium). Stay under 2,300 mg sodium a day. If you have a health condition such as heart disease or high blood pressure, your doctor  will likely tell you to limit sodium to no more than 1,500 mg a day.  Get moving and be active!  Aim for at least 30 minutes of physical activity most days of the week or 150 minutes of exercise a week.  MyPlate Servings Worksheet: 2,000 Calories  This worksheet tells you how many servings you should get each day from each food group, and tells you how much food makes a serving. Use this as a guide as you plan your meals throughout the day. Track your progress daily by writing in what you actually ate.  Food Group Daily MyPlate Goal What You Ate Today   Vegetables 5 Half-cups or 5 Servings  One serving is:  ½ cupor cooked vegetables    ½ baked  potato  ½ cup vegetable juice       Fruits 4 Half-cups or 4 Servings  One serving is:  ½ cup fresh, frozen, or canned fruit  1 medium piece of fruit with out peel  1 cup of berries or melon    ½ cup 100% fruit juice diluted with water between meals   Note: Make most choices fruit instead of juice.     Grains 6 Servings or 6 Ounces  One serving is:  1 slice bread  1 cup dry cereal  ½ cup cooked rice, pasta, or cereal  1 5-inch tortilla  1/2 cup potatoes no skin       Dairy 3 Servings or 3 Cups  One serving is:  1 cup milk  1½ ounces reduced-fat hard cheese  2 ounces processed cheese  1 cup low-fat yogurt  1/3 cup shredded cheese  Note: Choose low-fat or fat-free most often.     Protein 5½ Servings or 5½ Ounces  One serving is:  1 ounce cooked lean beef, pork, lamb, or ham  1 ounce cooked chicken or turkey (no skin)  1 ounce cooked fish or shellfish (not fried)  1 egg  ¼ cup egg substitute  ½ ounce nuts or seeds  1 tablespoon peanut or almond butter    ½ cup tofu  2 tablespoons hummus  If flushing avoid aged meat, cheese or nut butters.   Preventing Diarrhea due to Rapid Transit Time    *Symptoms of dumping syndrome include: feeling weak, dizzy and/or flushed within 30 minutes of eating. Cramping, pain, nausea, diarrhea and/or sweating may also occur.     Tips to avoid diarrhea  related to rapid transit and dumping syndrome:   Eat 4-6 small meals throughout the day   Try to eat a source of protein at each meal (such as: poultry, red meat, eggs, tofu, milk, yogurt, or cheese)   Limit concentrated sugars like candies, cookies, soda, juice, and syrup   Drink fluids 30-60 minutes before and after meals and snacks, but not during meals   Choose foods with soluble fibers (such as: oats, quinoa, fruits and vegetables without peels, and legumes)   Avoid extreme hot or cold foods and beverages   Eat slowly and rest for 15 minutes after a meal with feet up    Food Group Foods Allowed Foods to Avoid   Beverages  6-8 cups daily (no ice) Decaffeinated coffee and tea. Sugar free beverages, water drinks without sugar, water Caffeinated coffee or tea. Beverages made with sugar, corn syrup, or honey. Fruit juices and fruit drinks. Carbonated drinks.    Starches/Grains  8-12 servings daily Complex carbohydrates (such as: white bread, oatmeal, quinoa, cereal, potatoes, barley, white rice, and pasta).   High soluble fiber Sweet rolls, donuts, pastries, and cakes. Sugar cereals. Whole wheat, rye, pumpernickel brown rice and whole grains.  Low insoluble fiber   Meat and Other Protein Foods  Limit red meat to 12 ounces weekly Tender, well-cooked meats, poultry, and fish. Egg whites (whole eggs if tolerated). Soy foods prepared without added fat. Smooth peanut butter allowed if tolerable of fats. Powdered peanut butter (PB2) Fried meat, poultry, or fish. Luncheon meats (i.e. Bologna or salami). Sausage, hot dogs, menard. Tough or chewy meats. Dried beans and peas. (i.e. Rossi or kidney beans). Seeds or nuts.   Fats  1-3 tsp per meal A small to moderate amount of fat as tolerated: MCT oil, coconut oil, olive oil, canola, butter, margarine, cream, and cream cheese. High amounts of fat such as fried foods, avocados, olives, butter, vegetable oils, fried foods.   Fruits  3-6 servings daily Canned in fruit juice,  light syrup and drained. Fresh fruit without the peel. Diluted fruit 1 part to 3 water Canned fruit in sugar or syrup. Fruit juice. Dried fruits including prunes and raisins.   Vegetables  3-6 servings daily Fresh, frozen or canned vegetables cooked to a soft consistency. Raw vegetables (unless finely chopped).   Milk or Milk Foods  2-3 servings daily Milk (buttermilk, skim, 1% fat, and soy milk with no added sugar). Plain yogurt with no added sugar. Lactose free products. Cheese. Low-fat sugar ice cream. Whole milk, chocolate milk, half and half, regular ice cream, or creamers.   Miscellaneous Any allowed foods made with artificial sweeteners including: saccharin (Sweet 'N Low), sucralose (Splenda), and acesulfame potassium (Sunette, SweetOne), Stevia. Sugar, honey, syrup, jelly. Any sugar alcohol (such as: sorbitol, isomalt, hydrogenated starch hydrosylat or mannitol or xylitol).  Foods that list sugar, honey, syrup, xylitol, or sorbitol as one of the first three ingredients on the food label.     Source: http://applications.[employer].org/education/document.aspx?url=Patient+Education%5Ja97200.pdf

## 2017-06-02 ENCOUNTER — TELEPHONE (OUTPATIENT)
Dept: NEUROLOGY | Facility: HOSPITAL | Age: 57
End: 2017-06-02

## 2017-06-02 NOTE — TELEPHONE ENCOUNTER
----- Message from Naye Rivas sent at 6/2/2017  9:42 AM CDT -----  Contact: Patient   JPB/NEW- Patient would like to speak to a nurse  regarding her inscision. Patients call back number [phone]

## 2017-06-02 NOTE — TELEPHONE ENCOUNTER
"Received call back from pt. Discussed with pt that we do not have a plastic surgeon on staff to close her incision after surgery, however Dr. Hong has over 30 years of experience and is very well qualified to neatly close her incision. Pt prefers that Dr. Hong close her incision as opposed to a resident due to "not wanting a lumpy incision". Advised pt that she can discuss this with Dr. Hong at her pre-op appointment, but he is more than happy to accommodate this. Pt verbalizes understanding.   "

## 2017-06-06 ENCOUNTER — TELEPHONE (OUTPATIENT)
Dept: NEUROLOGY | Facility: HOSPITAL | Age: 57
End: 2017-06-06

## 2017-06-06 NOTE — TELEPHONE ENCOUNTER
Spoke to Dr Hong about lab results and phoned patient back. Dr Hong said it would not hurt for her to get B-12 injections this week and next for the low normal value of 185 and informed patient that the folate level is not in the panel and would have to have been ordered separately.  Instructed that she can have done by her local Dr if she wishes but she refused.

## 2017-06-06 NOTE — TELEPHONE ENCOUNTER
----- Message from Soraya Fuchs sent at 6/5/2017 11:20 AM CDT -----  EAW- Patient is calling because Dr. White ordered a B-12 panel but she only see one test marked on her order. Also, her result of the B-12 was 185 and it is really low, should she get a B-12 shot before she comes. Please call back to assist.

## 2017-06-06 NOTE — TELEPHONE ENCOUNTER
----- Message from Naye Rivas sent at 6/6/2017  2:21 PM CDT -----  Contact: patient  EAW- Patient is inquiring about her lab work including the folate value is not in her my [facility]. Can you mercy call lab and inquire. Patients call back [phone]. Also patient would like to know if she needs to get vit b12 shots back at home because her level is low.

## 2017-06-13 ENCOUNTER — TELEPHONE (OUTPATIENT)
Dept: NEUROLOGY | Facility: HOSPITAL | Age: 57
End: 2017-06-13

## 2017-06-13 NOTE — TELEPHONE ENCOUNTER
----- Message from Juli Yates sent at 6/13/2017 11:58 AM CDT -----  Contact: Patient  EAW- Patient would like a call back in regards to the Gallium scan from [facility] not being able to be viewed. Patient states Dr. Hollingsworth is trying to get with [facility] to correct this issue. Patient can be reached at [phone].

## 2017-06-13 NOTE — TELEPHONE ENCOUNTER
Spoke with pt regarding [facility] Ga scan images not loading.  She wants to get Dr. Armstrong at the [facility] in touch with the Rad IT person here to try and trouble shoot as to why the [facility] images are not loading.  Informed that Dr. May will not be able to help, he should be directed to Criselda in the Radiology dept for guidance.  She will reach out to her tomorrow.  Also informed her that this is not a quick fix and it will not be fix by Friday, regardless, her surgery is scheduled for MOnday as planned.  She verbalized understanding.

## 2017-06-13 NOTE — TELEPHONE ENCOUNTER
----- Message from Carrie Hart sent at 6/13/2017 11:50 AM CDT -----  Contact: Self  Pt is calling to speak with Ms. Damien.  Pt says Dr. Atkinson requested pt contact her about the gallium scans from the [facility].  She is trying to reach Dr. Lucian Hollingsworth about it.    She can be reached at [phone].    Thank you.

## 2017-06-16 ENCOUNTER — OFFICE VISIT (OUTPATIENT)
Dept: NEUROLOGY | Facility: HOSPITAL | Age: 57
DRG: 406 | End: 2017-06-16
Attending: SURGERY
Payer: MEDICARE

## 2017-06-16 ENCOUNTER — ANESTHESIA EVENT (OUTPATIENT)
Dept: SURGERY | Facility: HOSPITAL | Age: 57
DRG: 406 | End: 2017-06-16
Payer: MEDICARE

## 2017-06-16 ENCOUNTER — HOSPITAL ENCOUNTER (OUTPATIENT)
Dept: PREADMISSION TESTING | Facility: HOSPITAL | Age: 57
Discharge: HOME | DRG: 406 | End: 2017-06-16
Attending: SURGERY
Payer: MEDICARE

## 2017-06-16 ENCOUNTER — CLINICAL SUPPORT (OUTPATIENT)
Dept: LAB | Facility: HOSPITAL | Age: 57
DRG: 406 | End: 2017-06-16
Attending: SURGERY
Payer: MEDICARE

## 2017-06-16 VITALS
WEIGHT: 151.81 LBS | RESPIRATION RATE: 17 BRPM | DIASTOLIC BLOOD PRESSURE: 67 MMHG | BODY MASS INDEX: 26.9 KG/M2 | SYSTOLIC BLOOD PRESSURE: 114 MMHG | HEIGHT: 63 IN | OXYGEN SATURATION: 97 %

## 2017-06-16 VITALS
DIASTOLIC BLOOD PRESSURE: 67 MMHG | WEIGHT: 151.81 LBS | BODY MASS INDEX: 26.9 KG/M2 | HEIGHT: 63 IN | SYSTOLIC BLOOD PRESSURE: 114 MMHG | HEART RATE: 67 BPM | TEMPERATURE: 97 F

## 2017-06-16 DIAGNOSIS — Z01.818 PRE-OP TESTING: Primary | ICD-10-CM

## 2017-06-16 DIAGNOSIS — Z01.818 PRE-OP TESTING: ICD-10-CM

## 2017-06-16 DIAGNOSIS — C7B.02 METASTATIC MALIGNANT CARCINOID TUMOR TO LIVER: Primary | ICD-10-CM

## 2017-06-16 DIAGNOSIS — C7A.012 MALIGNANT CARCINOID TUMOR OF THE ILEUM: ICD-10-CM

## 2017-06-16 DIAGNOSIS — Z00.6 RESEARCH STUDY PATIENT: ICD-10-CM

## 2017-06-16 RX ORDER — ACETAMINOPHEN 10 MG/ML
1000 INJECTION, SOLUTION INTRAVENOUS EVERY 8 HOURS
Status: CANCELLED | OUTPATIENT
Start: 2017-06-16 | End: 2017-06-17

## 2017-06-16 RX ORDER — KETOROLAC TROMETHAMINE 15 MG/ML
15 INJECTION, SOLUTION INTRAMUSCULAR; INTRAVENOUS
Status: CANCELLED | OUTPATIENT
Start: 2017-06-16 | End: 2017-06-19

## 2017-06-16 RX ORDER — FAMOTIDINE 10 MG/ML
20 INJECTION INTRAVENOUS
Status: CANCELLED | OUTPATIENT
Start: 2017-06-16

## 2017-06-16 RX ORDER — PREGABALIN 75 MG/1
75 CAPSULE ORAL
Status: CANCELLED | OUTPATIENT
Start: 2017-06-16

## 2017-06-16 RX ORDER — LIDOCAINE HYDROCHLORIDE 10 MG/ML
1 INJECTION, SOLUTION EPIDURAL; INFILTRATION; INTRACAUDAL; PERINEURAL ONCE
Status: CANCELLED | OUTPATIENT
Start: 2017-06-16 | End: 2017-06-16

## 2017-06-16 RX ORDER — ONDANSETRON 2 MG/ML
8 INJECTION INTRAMUSCULAR; INTRAVENOUS
Status: CANCELLED | OUTPATIENT
Start: 2017-06-16

## 2017-06-16 RX ORDER — ERYTHROMYCIN 500 MG/1
TABLET, COATED ORAL
Qty: 6 TABLET | Refills: 0 | Status: SHIPPED | OUTPATIENT
Start: 2017-06-16 | End: 2017-06-27

## 2017-06-16 RX ORDER — TROSPIUM CHLORIDE 20 MG/1
TABLET, FILM COATED ORAL
COMMUNITY
Start: 2017-05-30 | End: 2017-09-27

## 2017-06-16 RX ORDER — ENOXAPARIN SODIUM 100 MG/ML
30 INJECTION SUBCUTANEOUS
Status: CANCELLED | OUTPATIENT
Start: 2017-06-16

## 2017-06-16 RX ORDER — NEOMYCIN SULFATE 500 MG/1
TABLET ORAL
Qty: 6 TABLET | Refills: 0 | Status: SHIPPED | OUTPATIENT
Start: 2017-06-16 | End: 2017-06-27

## 2017-06-16 RX ORDER — SODIUM CHLORIDE, SODIUM LACTATE, POTASSIUM CHLORIDE, CALCIUM CHLORIDE 600; 310; 30; 20 MG/100ML; MG/100ML; MG/100ML; MG/100ML
INJECTION, SOLUTION INTRAVENOUS CONTINUOUS
Status: CANCELLED | OUTPATIENT
Start: 2017-06-16

## 2017-06-16 NOTE — ANESTHESIA PREPROCEDURE EVALUATION
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    Past Surgical History:   Procedure Laterality Date    breast reduction  and implants Bilateral 2012    EAR MASTOIDECTOMY W/ COCHLEAR IMPLANT W/ LANDMARK Right 2014    SBR, liver r/s, gall bladder  07/2013    Ki 67- 2%    SBR, lymph node r/s  10/2012    found carcinoid with colon obstruction    URETHRAL SLING  2012        Anesthesia Evaluation    I have reviewed the Patient Summary Reports.    I have reviewed the Nursing Notes.   I have reviewed the Medications.     Review of Systems  Anesthesia Hx:  Hx of Anesthetic complications Pt states she feels that PONV r/t to sensitivity to post-op pain medication History of prior surgery of interest to airway management or planning: Previous anesthesia: General, MAC  colonoscopy, EGD with MAC.  Procedure performed at an [facility]. Personal Hx of Anesthesia complications, Post-Operative Nausea/Vomiting, in the past, but not with recent anesthetics / prophylaxis   Social:  Former Smoker, Alcohol Use    Hematology/Oncology:  Hematology Normal      Current/Recent Cancer. (carcinoid of ileum with mets to liver) surgery and radiation   EENT/Dental:  EENT/Dental Normal  Ears General/Symptom(s) Hearing Impairment: S/P Trochlear implant - right    Cardiovascular:   Exercise tolerance: good Denies Hypertension.  Denies Dysrhythmias.   Denies Angina.        Pulmonary:   Denies Shortness of breath.    Renal/:  Renal/ Normal     Hepatic/GI:   GERD, well controlled Liver Disease, (NET with mets) Chronic diarrhea    Neurological:  Neurology Normal    Endocrine:   Hypothyroidism        Physical Exam  General:  Well nourished    Airway/Jaw/Neck:  Airway Findings: Mouth Opening: Small, but > 3cm  Tongue: Normal  General Airway Assessment: Adult  Mallampati: II  TM Distance: Normal, at least 6 cm        Eyes/Ears/Nose:  EYES/EARS/NOSE FINDINGS: Normal   Dental:  Dental Findings: In tact   Chest/Lungs:  Chest/Lungs Clear    Heart/Vascular:  Heart Findings: Normal Heart murmur: negative    Abdomen:  Abdomen Findings: Normal      Mental Status:  Mental Status Findings:  Alert and Oriented, Cooperative, Anxious       Lab Results   Component Value Date    WBC 3.93 06/16/2017    HGB 12.4 06/16/2017    HCT 36.8 (L) 06/16/2017    MCV 96 06/16/2017     06/16/2017       Chemistry        Component Value Date/Time     06/16/2017 1400    K 3.9 06/16/2017 1400     06/16/2017 1400    CO2 25 06/16/2017 1400    BUN 14 06/16/2017 1400    CREATININE 0.8 06/16/2017 1400    GLU 91 06/16/2017 1400        Component Value Date/Time    CALCIUM 9.1 06/16/2017 1400    ALKPHOS 105 06/16/2017 1400    AST 25 06/16/2017 1400    ALT 22 06/16/2017 1400    BILITOT 0.4 06/16/2017 1400        TTE 5/29/2017  CONCLUSIONS     1 - Normal left ventricular systolic function (EF 60-65%).     2 - Normal left ventricular diastolic function.     3 - Normal right ventricular systolic function .       Anesthesia Plan  Type of Anesthesia, risks & benefits discussed:  Anesthesia Type:  general, epidural  Patient's Preference:   Intra-op Monitoring Plan: central line and arterial line  Intra-op Monitoring Plan Comments:   Post Op Pain Control Plan:   Post Op Pain Control Plan Comments:   Induction:   IV  Beta Blocker:  Patient is not currently on a Beta-Blocker (No further documentation required).       Informed Consent: Patient understands risks and agrees with Anesthesia plan.  Questions answered. Anesthesia consent signed with patient.  ASA Score: 3     Day of Surgery Review of History & Physical:        Anesthesia Plan Notes: Pt very sensitive to narcotic pain medication. Discussed possible post-op epidural if pt is having difficulty  with pain control.           Ready For Surgery From Anesthesia Perspective.

## 2017-06-16 NOTE — PROGRESS NOTES
"NOLANETS:  [facility]  A collaboration between [facility] and [facility]      PATIENT: Krysten Muhammad  MRN: [id number]  DATE: 6/16/2017    Subjective:      Chief Complaint: Follow-up (sign consents and preop pending sa)      Vitals:   Vitals:    06/16/17 1013   BP: 114/67   Pulse: 67   Temp: 97.2 °F (36.2 °C)   TempSrc: Oral   Weight: 68.9 kg (151 lb 12.8 oz)   Height: 5' 3" (1.6 m)        Karnofsky Score:   Karnofsky Score    Karnofsky Score:  90% - Able to Carry on Normal Activity: Minor Symptoms of Disease          Diagnosis:   1. 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 Malignant carcinoid tumor of the ileum         Oncologic History: s/p TI carcinoid 2012/2013.      Interval History: Ms. 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 10/2012    Secondary neuroendocrine tumor of distant lymph nodes 2012       Past Surgical History:  Past Surgical History:   Procedure Laterality Date    breast reduction  and implants  2012    SBR, liver r/s, gall bladder  07/2013    Ki 67- 2%    SBR, lymph node r/s  10/2012    found carcinoid with colon obstruction       Family History:  Family History   Problem Relation Age of Onset    Carcinoid Cancer Mother        Allergies:  Review of patient's allergies indicates:   Allergen Reactions    Ciprofloxacin Other (See Comments)     Nausea and joint pain      Dilaudid [hydromorphone (bulk)] Nausea Only    Epinephrine Other (See Comments)     Carcinoid patient    Levaquin [levofloxacin] Other (See Comments)     Nausea and joint pain    Morphine Nausea And " Vomiting    Ciprofloxacin hcl     Hydromorphone Nausea Only    Quinolones        Medications:  Current Outpatient Prescriptions   Medication Sig Dispense Refill    estradiol (ESTRACE) 1 MG tablet Take 1 mg by mouth once daily.      MYRBETRIQ 50 mg Tb24       progesterone (PROMETRIUM) 200 MG capsule Take 200 mg by mouth every evening.      thyroid, pork, (ARMOUR THYROID) 90 mg Tab Take 90 mg by mouth once daily.      trospium (SANCTURA) 20 mg Tab tablet        No current facility-administered medications for this visit.        Review of Systems   Constitutional: Positive for fatigue. Negative for activity change, appetite change, chills, fever and unexpected weight change.   HENT: Negative.  Negative for congestion, ear pain, rhinorrhea and sinus pressure.    Eyes: Negative.  Negative for photophobia, pain and redness.   Respiratory: Negative.  Negative for cough, chest tightness, shortness of breath and wheezing.    Cardiovascular: Negative for chest pain, palpitations and leg swelling.   Gastrointestinal: Negative.  Negative for abdominal distention, abdominal pain, anal bleeding, blood in stool, constipation, diarrhea, nausea, rectal pain and vomiting.   Endocrine: Negative.  Negative for cold intolerance, heat intolerance, polydipsia, polyphagia and polyuria.   Genitourinary: Negative.  Negative for difficulty urinating, dysuria and frequency.   Musculoskeletal: Negative.  Negative for arthralgias, back pain, gait problem, myalgias, neck pain and neck stiffness.   Skin: Positive for color change (flushing). Negative for pallor and rash.   Allergic/Immunologic: Negative.  Negative for environmental allergies, food allergies and immunocompromised state.   Neurological: Negative.  Negative for dizziness, syncope, weakness and light-headedness.   Hematological: Negative.  Negative for adenopathy. Does not bruise/bleed easily.   Psychiatric/Behavioral: Negative.  Negative for agitation, behavioral problems,  confusion, decreased concentration, dysphoric mood, hallucinations, self-injury, sleep disturbance and suicidal ideas. The patient is not nervous/anxious and is not hyperactive.       Objective:      Physical Exam   Constitutional: She is oriented to person, place, and time. She appears well-developed and well-nourished. No distress.   HENT:   Head: Normocephalic and atraumatic.   Mouth/Throat: Oropharynx is clear and moist.   Eyes: EOM are normal. Pupils are equal, round, and reactive to light. No scleral icterus.   Neck: Normal range of motion. Neck supple. No thyromegaly present.   Cardiovascular: Normal rate, regular rhythm, normal heart sounds and intact distal pulses.  Exam reveals no gallop.    No murmur heard.  Pulmonary/Chest: Effort normal and breath sounds normal. No respiratory distress. She has no wheezes. She has no rales.   Abdominal: Soft. Bowel sounds are normal. She exhibits no distension and no mass. There is no tenderness. There is no rebound and no guarding. No hernia. Hernia confirmed negative in the ventral area.   Well healed scar   Musculoskeletal: Normal range of motion. She exhibits no edema or tenderness.   Lymphadenopathy:        Head (right side): No submandibular adenopathy present.        Head (left side): No submandibular adenopathy present.     She has no cervical adenopathy.        Right cervical: No superficial cervical adenopathy present.       Left cervical: No superficial cervical adenopathy present.        Right axillary: No pectoral and no lateral adenopathy present.        Left axillary: No pectoral and no lateral adenopathy present.       Right: No inguinal and no supraclavicular adenopathy present.        Left: No inguinal and no supraclavicular adenopathy present.   Neurological: She is alert and oriented to person, place, and time. She has normal reflexes. No cranial nerve deficit.   Skin: Skin is warm, dry and intact. No rash noted. She is not diaphoretic. No erythema.  No pallor.   Psychiatric: She has a normal mood and affect. Her behavior is normal. Thought content normal.   Nursing note and vitals reviewed.     Assessment:       1. 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 Malignant carcinoid tumor of the ileum        Laboratory Data:    Neuroendocrine Labs Latest Ref Rng & Units 6/16/2017 5/30/2017 5/29/2017 5/29/2017 5/9/2017 4/3/2017 2/9/2017   EXT 5 HIAA BLOOD 0 - 22 ng/ml - - - - - - 25(A)   EXT SEROTONIN 56.0 - 244.0 ng/ml - - - - 817(A) - 655(A)   EXT CHROMOGRANIN A 0.0 - 95.0 ng/ml - - - - 270(A) - 177(A)   EXT PANCREASTATIN JERE 0.0 - 88.0 - - - - - - 70   EXT NEUROKININ A JERE 0 - 40 pg/ml - - - - 15 - 20   VITAMIN B12 180 - 914 ng/L - - 185 - - - -   EXT VITAMIN B12 211.0 - 911.0 - - - - - 396 -   EXT FREE T4 4.5 - 10.9 - - - - - 9.3 -   EXT FREE T3 22.5 - 37.0 - - - - - 33.0 -   EXT TSH 0.55 - 4.78 - - - - - 0.023(A) -   EXT 25 HYDROXY VIT D2 - - - - - - - <4.0   EXT WBC 4.8 - 10.8 - - - - - 4.5(A) -   EXT HGB 12.0 - 16.0 - - - - - 13.4 -   EXT HCT 37.0 - 47.0 - - - - - 39.5 -   EXT PLATLETS 130.0 - 400.0 - - - - - 260 -   EXT GLUCOSE 74.0 - 106.0 - - - - - 71(A) -   EXT BUN 7.0 - 18.0 - - - - - 13 -   EXT CREATININE 0.6 - 1.3 - - - - - 1.0 -   EXT .0 - 145.0 - - - - - 141 -   EXT K 3.5 - 5.1 - - - - - 3.8 -   EXT CHLORIDE 98.0 - 107.0 - - - - - 104 -   EXT CO2 21.0 - 32.0 - - - - - 27 -   EXT CALCIUM 8.5 - 10.1 - - - - - 8.8 -   EXT PROTEIN, TOTAL 6.4 - 8.2 - - - - - 7.5 -   EXT ALBUMIN 3.4 - 5.0 - - - - - 3.8 -   EXT TOTAL BILIRUBIN 0.2 - 1.0 - - - - - 0.3 -   EXT ALK PHOSPHATASE 46.0 - 116.0 - - - - - 82 -   EXT SGOT (AST) 15.0 - 37.0 - - - - - 23 -   EXT ALT 14.0 - 59.0 - - - - - 35 -   EXT LDH 81.0 - 234.0 - - - - - - 204.0   EXT TRIGLYCERIDES 35.0 - 160.0 - - - - - - 131   EXT CHOLESETEROL 140.0 - 210.0 - - - - - - 240(A)   EXT HDL - - - - - - - 62   EXT LDL 0.0 - 100.0 - - - - - - 151.8(A)   EXT MG 1.8 - 2.4 - - - - - - 2.10   Weight - 151 lbs  13 oz 155 lbs - 155 lbs - - -     GA68:        Impression: Familial carcinoid syndrome with liver mets  Possible lesion in spleen negativwe on ga68. .  History of intolerance to higher dose Sandostatin and lanreotide.  Not on therapy at present. liver metastases show progression  Plan:       Ga 68 scan reviewed. 3 lesions in liver viz.  4 on MRI.  Scans reviewed and discussed with patient  Multiple questions  Would like tissue saved for outside studies  looks resectable  1 hour counseling   Risks/benefits explained and accepted.  All questions answered.            CIARA Hong MD, FACS  Professor of Surgery, [facility]  Neuroendocrine Surgery, Hepatic/Pancreatic & General Surgery  [address], [address]  [place]  [address]  ph. [phone]; [phone]  fax. [phone]

## 2017-06-16 NOTE — PATIENT INSTRUCTIONS
Patient Instructions       Take a Hibiclens shower twice a day for 3 days prior to surgery, including the morning of surgery.   Gargle with Listerine twice a day for 2 days prior to surgery, including the morning of surgery.      Today     Appointment with Anesthesia   Pre [place] for Hospital Admission - Go to 1st floor of the hospital-admitting desk. You will do paper work, get blood drawn,  get x-rays and see Anesthesia at this time.     Sunday   CLEAR LIQUIDS all day   Start bowel prep  Ducolax Laxative tablets - 2 tablets at 12 noon  Magnesium Citrate - 1 bottle at 2 pm  Antibiotic prep--sent to [facility]   Do not eat or drink anything after midnight.                 6/19/2017, Monday   Hospital Admission for surgery.  Report to 2nd floor, Same Day Surgery desk at _07:00am   Surgery is scheduled for 09:00am        [facility] [address]  [place]  [id number]  [phone]      INFORMATION REGARDING YOUR PROCEDURE      We look forward to serving you and your family and appreciate that you have chosen [facility] for your healthcare needs. Before, during, and after your surgery, you will be cared for by skilled medical professionals. Our surgeons, anesthesiologists, nurses,  and other healthcare professionals will work with you and your family to ensure a safe, smooth and comfortable surgery and recovery.    In order to best meet your pre-admission needs, your surgeon or ordering physicians office will contact our Scheduling Office at [phone] and schedule a Pre-Procedure Appointment.  This should preferably be done 72 hours or greater before your scheduled procedure date.     During your pre-procedure visit your insurance will be verified for your procedure. You will meet with a Registered Nurse and an Anesthesiologist or Nurse Anesthetist. If tests are required, they will be performed during your visit. Please allow about one and a half  hours for this visit.      You will need to bring the following information or items with you to your Pre-Procedure Appointment:    1.  Picture Identification  2.  Insurance Card  3.  Current list of medications to include name and dosage  4.  List of allergies  5.  Orders and any other forms your doctor has given to you  6.  Copies of lab results performed at other facilities. This may include blood work, EKGs or chest xrays.   These results can be faxed to [phone].    The Pre-Op Center Registration Desk is located on the first floor of the hospital ([address] [place] [address]) in the [facility].  The Pre-Operative Center is located on the second floor of the hospital. Someone will walk you from the registration area to the Pre-Operative Center.    Free parking is located in the front of the hospital and medical office building and is easily accessed from [address]. When you arrive, please check in at the main information desk of the hospital.    Please call Outpatient Surgery at [phone] after 12:00pm on the day before your procedure for your arrival time and updated procedure time.      Pre-Op Bathing Instructions    Before surgery, you can play an important role in your own health.    Because skin is not sterile, we need to be sure that your skin is as free of germs as possible. By following the instructions below, you can reduce the number of germs on your skin before surgery.    IMPORTANT: You will need to shower with a special soap called Hibiclens*, available at any pharmacy.  If you are allergic to Chlorhexidine (the antiseptic in Hibiclens), use an antibacterial soap such as Dial Soap for your preoperative shower.  You will shower with Hibiclens both the night before your surgery and the morning of your surgery.  Do not use Hibiclens on the head, face or genitals to avoid injury to those areas.    STEP #1: THE NIGHT BEFORE YOUR SURGERY     1. Do not shave the area  of your body where your surgery will be performed.  2. Shower and wash your hair and body as usual with your normal soap and shampoo.  3. Rinse your hair and body thoroughly after you shower to remove all soap residue.  4. With your hand, apply one packet of Hibiclens soap to the surgical site.   5. Wash the site gently for five (5) minutes. Do not scrub your skin too hard.   6. Do not wash with your regular soap after Hibiclens is used.  7. Rinse your body thoroughly.  8. Pat yourself dry with a clean, soft towel.  9. Do not use lotion, cream, or powder.  10. Wear clean clothes.    STEP #2: THE MORNING OF YOUR SURGERY     1. Repeat Step #1.    * Not to be used by people allergic to Chlorhexidine.

## 2017-06-16 NOTE — PLAN OF CARE
Allergies, medical, surgical, family and psychosocial histories reviewed with patient. Periop plan of care discussed. Preop instructions given, including medications to take and to hold. Time given for questions and pt voiced understanding.

## 2017-06-16 NOTE — PLAN OF CARE
· Arrive on 6/19/2017 at  7:00 am.  · Report to the 2nd floor Procedural Check In Room .   · Nothing to eat or drink after midnight the night before your procedure.  · Take the [place] Thyroid medications the morning of surgery with a small sip of water.                                                         · Please remove all body piercings and leave all your jewelery and valuables at home .  · Please remove your contact lenses.   · Wear loose comfortable clothing.  · You will not be able to drive that day, please make arrangement for transportation to and from your procedure.  · You cannot be alone for 24 hours after anesthesia. Make arrangements as needed.  · Shower twice a day for  3 days before your procedure and the morning of your procedure with Hibiclens antibacterial solution.  · No lotions, creams or powders  · Gargle twice daily with Gold Listerine 3 days prior to surgery  · Stop Aspirin, Ibuprofen, Advil, Motrin, Aleve, Nuprin, Naprosyn (all NSAID Medication) or any other blood thinners 5 days before your procedure unless directed by your physician.  Also hold all over the counter vitamins and herbal supplements for 5 days prior to your procedure.  · You may take Tylenol or Acetaminophen up the day of surgery for any pain.        Call [phone] with any questions.        Pre-Op Bathing Instructions    Before surgery, you can play an important role in your own health.    Because skin is not sterile, we need to be sure that your skin is as free of germs as possible. By following the instructions below, you can reduce the number of germs on your skin before surgery.    IMPORTANT: You will need to shower with a special soap called Hibiclens*, available at any pharmacy, over the counter usually in the first aid isle.  If you are allergic to Chlorhexidine (the antiseptic in Hibiclens), use an antibacterial soap such as Dial Soap for your preoperative showers.  You will shower with Hibiclens twice a day for  three days prior to surgery. Both the night before your surgery and the morning of your surgery see steps 2 and 3.   Do not use Hibiclens on the head, face or genitals to avoid injury to those areas.    STEP #1  1.    Three (3) days prior to surgery, shower twice a day with Hibiclens solution      STEP #2: THE NIGHT BEFORE YOUR SURGERY     1. Do not shave the area of your body where your surgery will be performed.  2. Wash your hair as usual with your normal  Shampoo. Wash body shoulder to toes with Hibiclens.  3. Squeeze Hibiclens into hand apply to surgical site.   4. Wash the site gently for five (5) minutes. Do not scrub your skin too hard.   5. Do not wash with your regular soap after Hibiclens is used.  6. Rinse your body thoroughly.  7. Pat yourself dry with a clean, soft towel.  8. Do not use lotion, cream, or powder.  9. Wear clean clothes.    STEP #3: THE MORNING OF YOUR SURGERY     1. Repeat Step #2.    * Not to be used by people allergic to Chlorhexidine.        Anesthesia: General Anesthesia  Youre due to have surgery. During surgery, youll be given medication called anesthesia. (It is also called anesthetic.) This will keep you comfortable and pain-free. Your anesthesia provider will use general anesthesia. This sheet tells you more about it.  What is general anesthesia?    General anesthesia puts you into a state like deep sleep. It goes into the bloodstream (IV anesthetics), into the lungs (gas anesthetics), or both. You feel nothing during the procedure. You will not remember it. During the procedure, the anesthesia provider monitors you continuously. He or she checks your heart rate and rhythm, blood pressure, breathing, and blood oxygen.  · IV Anesthetics. IV anesthetics are given through an IV line in your arm. Theyre often given first. This is so you are asleep before a gas anesthetic is started. Some kinds of IV anesthetics relieve pain. Others relax you. Your doctor will decide which kind  is best in your case.  · Gas Anesthetics. Gas anesthetics are breathed into the lungs. They are often used to keep you asleep. They can be given through a facemask or a tube placed in your larynx or trachea (breathing tube).  · If you have a facemask, your anesthesia provider will most likely place it over your nose and mouth while youre still awake. Youll breathe oxygen through the mask as your IV anesthetic is started. Gas anesthetic may be added through the mask.  · If you have a tube in the larynx or trachea, it will be inserted into your throat after youre asleep.  Anesthesia tools and medications  You will likely have:  · IV anesthetics. These are put into an IV line into your bloodstream.  · Gas anesthetics. You breathe these anesthetics into your lungs, where they pass into your bloodstream.  · Pulse oximeter. This is a small clip that is attached to the end of your finger. This measures your blood oxygen level.  · Electrocardiography leads (electrodes). These are small sticky pads that are placed on your chest. They record your heart rate and rhythm.  · Blood pressure cuff. This reads your blood pressure.    Anesthesia safety  · Follow all instructions you are given for how long not to eat or drink before your procedure.  · Be sure your doctor knows what medications and drugs you take. This includes over-the-counter medications, herbs, supplements, alcohol or other drugs. You will be asked when those were last taken.  · Have an adult family member or friend drive you home after the procedure.  · For the first 24 hours after your surgery:  · Do not drive or use heavy equipment.  · Have a trusted family member or spouse make important decisions or sign documents.  · Avoid alcohol.  · Have a responsible adult stay with you. He or she can watch for problems and help keep you safe.  © 9441-8865 [employer]. [address] [place] [address], [place] [address]. All rights reserved. This information is not  intended as a substitute for professional medical care. Always follow your healthcare professional's instructions.

## 2017-06-16 NOTE — DISCHARGE INSTRUCTIONS
· Arrive on 6/19/2017 at  7:00 am.  · Report to the 2nd floor Procedural Check In Room .   · Nothing to eat or drink after midnight the night before your procedure.  · Take the [place] Thyroid medications the morning of surgery with a small sip of water.                                                         · Please remove all body piercings and leave all your jewelery and valuables at home .  · Please remove your contact lenses.   · Wear loose comfortable clothing.  · You will not be able to drive that day, please make arrangement for transportation to and from your procedure.  · You cannot be alone for 24 hours after anesthesia. Make arrangements as needed.  · Shower twice a day for  3 days before your procedure and the morning of your procedure with Hibiclens antibacterial solution.  · No lotions, creams or powders  · Gargle twice daily with Gold Listerine 3 days prior to surgery  · Stop Aspirin, Ibuprofen, Advil, Motrin, Aleve, Nuprin, Naprosyn (all NSAID Medication) or any other blood thinners 5 days before your procedure unless directed by your physician.  Also hold all over the counter vitamins and herbal supplements for 5 days prior to your procedure.  · You may take Tylenol or Acetaminophen up the day of surgery for any pain.        Call [phone] with any questions.        Pre-Op Bathing Instructions    Before surgery, you can play an important role in your own health.    Because skin is not sterile, we need to be sure that your skin is as free of germs as possible. By following the instructions below, you can reduce the number of germs on your skin before surgery.    IMPORTANT: You will need to shower with a special soap called Hibiclens*, available at any pharmacy, over the counter usually in the first aid isle.  If you are allergic to Chlorhexidine (the antiseptic in Hibiclens), use an antibacterial soap such as Dial Soap for your preoperative showers.  You will shower with Hibiclens twice a day for  three days prior to surgery. Both the night before your surgery and the morning of your surgery see steps 2 and 3.   Do not use Hibiclens on the head, face or genitals to avoid injury to those areas.    STEP #1  1.    Three (3) days prior to surgery, shower twice a day with Hibiclens solution      STEP #2: THE NIGHT BEFORE YOUR SURGERY     1. Do not shave the area of your body where your surgery will be performed.  2. Wash your hair as usual with your normal  Shampoo. Wash body shoulder to toes with Hibiclens.  3. Squeeze Hibiclens into hand apply to surgical site.   4. Wash the site gently for five (5) minutes. Do not scrub your skin too hard.   5. Do not wash with your regular soap after Hibiclens is used.  6. Rinse your body thoroughly.  7. Pat yourself dry with a clean, soft towel.  8. Do not use lotion, cream, or powder.  9. Wear clean clothes.    STEP #3: THE MORNING OF YOUR SURGERY     1. Repeat Step #2.    * Not to be used by people allergic to Chlorhexidine.        Anesthesia: General Anesthesia  Youre due to have surgery. During surgery, youll be given medication called anesthesia. (It is also called anesthetic.) This will keep you comfortable and pain-free. Your anesthesia provider will use general anesthesia. This sheet tells you more about it.  What is general anesthesia?    General anesthesia puts you into a state like deep sleep. It goes into the bloodstream (IV anesthetics), into the lungs (gas anesthetics), or both. You feel nothing during the procedure. You will not remember it. During the procedure, the anesthesia provider monitors you continuously. He or she checks your heart rate and rhythm, blood pressure, breathing, and blood oxygen.  · IV Anesthetics. IV anesthetics are given through an IV line in your arm. Theyre often given first. This is so you are asleep before a gas anesthetic is started. Some kinds of IV anesthetics relieve pain. Others relax you. Your doctor will decide which kind  is best in your case.  · Gas Anesthetics. Gas anesthetics are breathed into the lungs. They are often used to keep you asleep. They can be given through a facemask or a tube placed in your larynx or trachea (breathing tube).  · If you have a facemask, your anesthesia provider will most likely place it over your nose and mouth while youre still awake. Youll breathe oxygen through the mask as your IV anesthetic is started. Gas anesthetic may be added through the mask.  · If you have a tube in the larynx or trachea, it will be inserted into your throat after youre asleep.  Anesthesia tools and medications  You will likely have:  · IV anesthetics. These are put into an IV line into your bloodstream.  · Gas anesthetics. You breathe these anesthetics into your lungs, where they pass into your bloodstream.  · Pulse oximeter. This is a small clip that is attached to the end of your finger. This measures your blood oxygen level.  · Electrocardiography leads (electrodes). These are small sticky pads that are placed on your chest. They record your heart rate and rhythm.  · Blood pressure cuff. This reads your blood pressure.    Anesthesia safety  · Follow all instructions you are given for how long not to eat or drink before your procedure.  · Be sure your doctor knows what medications and drugs you take. This includes over-the-counter medications, herbs, supplements, alcohol or other drugs. You will be asked when those were last taken.  · Have an adult family member or friend drive you home after the procedure.  · For the first 24 hours after your surgery:  · Do not drive or use heavy equipment.  · Have a trusted family member or spouse make important decisions or sign documents.  · Avoid alcohol.  · Have a responsible adult stay with you. He or she can watch for problems and help keep you safe.  © 5832-4422 [employer]. [address] [place] [address], [place] [address]. All rights reserved. This information is not  intended as a substitute for professional medical care. Always follow your healthcare professional's instructions.

## 2017-06-18 ENCOUNTER — NURSE TRIAGE (OUTPATIENT)
Dept: ADMINISTRATIVE | Facility: CLINIC | Age: 57
End: 2017-06-18

## 2017-06-18 NOTE — TELEPHONE ENCOUNTER
"  Reason for Disposition   Health Information question, no triage required and triager able to answer question    Answer Assessment - Initial Assessment Questions  1. REASON FOR CALL or QUESTION: "What is your reason for calling today?" or "How can I best help you?" or "What question do you have that I can help answer?"      Bowel prep is not working    Protocols used: ST INFORMATION ONLY CALL-A-AH    "

## 2017-06-19 ENCOUNTER — HOSPITAL ENCOUNTER (INPATIENT)
Facility: HOSPITAL | Age: 57
LOS: 6 days | Discharge: HOME | DRG: 406 | End: 2017-06-25
Attending: SURGERY | Admitting: SURGERY
Payer: MEDICARE

## 2017-06-19 ENCOUNTER — ANESTHESIA (OUTPATIENT)
Dept: SURGERY | Facility: HOSPITAL | Age: 57
DRG: 406 | End: 2017-06-19
Payer: MEDICARE

## 2017-06-19 DIAGNOSIS — K66.0 INTESTINAL ADHESIONS: ICD-10-CM

## 2017-06-19 DIAGNOSIS — C7B.02 METASTATIC MALIGNANT CARCINOID TUMOR TO LIVER: Primary | ICD-10-CM

## 2017-06-19 DIAGNOSIS — C7A.012 MALIGNANT CARCINOID TUMOR OF THE ILEUM: ICD-10-CM

## 2017-06-19 LAB
ANION GAP SERPL CALC-SCNC: 5 MMOL/L
BASOPHILS # BLD AUTO: 0.01 K/UL
BASOPHILS NFR BLD: 0.1 %
BUN SERPL-MCNC: 7 MG/DL
CALCIUM SERPL-MCNC: 7.5 MG/DL
CHLORIDE SERPL-SCNC: 106 MMOL/L
CO2 SERPL-SCNC: 21 MMOL/L
CREAT SERPL-MCNC: 0.8 MG/DL
DIFFERENTIAL METHOD: ABNORMAL
EOSINOPHIL # BLD AUTO: 0 K/UL
EOSINOPHIL NFR BLD: 0 %
ERYTHROCYTE [DISTWIDTH] IN BLOOD BY AUTOMATED COUNT: 11.6 %
EST. GFR  (AFRICAN AMERICAN): >60 ML/MIN/1.73 M^2
EST. GFR  (NON AFRICAN AMERICAN): >60 ML/MIN/1.73 M^2
GLUCOSE SERPL-MCNC: 124 MG/DL (ref 70–110)
GLUCOSE SERPL-MCNC: 146 MG/DL
GLUCOSE SERPL-MCNC: 188 MG/DL (ref 70–110)
HCO3 UR-SCNC: 20.5 MMOL/L (ref 24–28)
HCO3 UR-SCNC: 23.5 MMOL/L (ref 24–28)
HCT VFR BLD AUTO: 24 %
HCT VFR BLD CALC: 28 %PCV (ref 36–54)
HCT VFR BLD CALC: 32 %PCV (ref 36–54)
HGB BLD-MCNC: 10 G/DL
HGB BLD-MCNC: 11 G/DL
HGB BLD-MCNC: 8.4 G/DL
LYMPHOCYTES # BLD AUTO: 0.6 K/UL
LYMPHOCYTES NFR BLD: 8.5 %
MCH RBC QN AUTO: 32.4 PG
MCHC RBC AUTO-ENTMCNC: 35 %
MCV RBC AUTO: 93 FL
MONOCYTES # BLD AUTO: 0.5 K/UL
MONOCYTES NFR BLD: 7.7 %
NEUTROPHILS # BLD AUTO: 5.9 K/UL
NEUTROPHILS NFR BLD: 83.3 %
PCO2 BLDA: 34.4 MMHG (ref 35–45)
PCO2 BLDA: 38 MMHG (ref 35–45)
PH SMN: 7.34 [PH] (ref 7.35–7.45)
PH SMN: 7.44 [PH] (ref 7.35–7.45)
PLATELET # BLD AUTO: 223 K/UL
PMV BLD AUTO: 9.6 FL
PO2 BLDA: 178 MMHG (ref 80–100)
PO2 BLDA: 210 MMHG (ref 80–100)
POC BE: -1 MMOL/L
POC BE: -5 MMOL/L
POC IONIZED CALCIUM: 1.13 MMOL/L (ref 1.06–1.42)
POC IONIZED CALCIUM: 1.16 MMOL/L (ref 1.06–1.42)
POC SATURATED O2: 100 % (ref 95–100)
POC SATURATED O2: 100 % (ref 95–100)
POC TCO2: 22 MMOL/L (ref 23–27)
POC TCO2: 24 MMOL/L (ref 23–27)
POCT GLUCOSE: 148 MG/DL (ref 70–110)
POTASSIUM BLD-SCNC: 4 MMOL/L (ref 3.5–5.1)
POTASSIUM BLD-SCNC: 4 MMOL/L (ref 3.5–5.1)
POTASSIUM SERPL-SCNC: 4.2 MMOL/L
RBC # BLD AUTO: 2.59 M/UL
SAMPLE: ABNORMAL
SAMPLE: ABNORMAL
SODIUM BLD-SCNC: 132 MMOL/L (ref 136–145)
SODIUM BLD-SCNC: 133 MMOL/L (ref 136–145)
SODIUM SERPL-SCNC: 132 MMOL/L
WBC # BLD AUTO: 7.03 K/UL

## 2017-06-19 PROCEDURE — 25000003 PHARM REV CODE 250: Performed by: SURGERY

## 2017-06-19 PROCEDURE — 63600175 PHARM REV CODE 636 W HCPCS: Performed by: NURSE ANESTHETIST, CERTIFIED REGISTERED

## 2017-06-19 PROCEDURE — C1751 CATH, INF, PER/CENT/MIDLINE: HCPCS | Performed by: NURSE ANESTHETIST, CERTIFIED REGISTERED

## 2017-06-19 PROCEDURE — C1819 TISSUE LOCALIZATION-EXCISION: HCPCS | Performed by: SURGERY

## 2017-06-19 PROCEDURE — 88360 TUMOR IMMUNOHISTOCHEM/MANUAL: CPT | Performed by: PATHOLOGY

## 2017-06-19 PROCEDURE — P9045 ALBUMIN (HUMAN), 5%, 250 ML: HCPCS | Performed by: NURSE ANESTHETIST, CERTIFIED REGISTERED

## 2017-06-19 PROCEDURE — 37000008 HC ANESTHESIA 1ST 15 MINUTES: Performed by: SURGERY

## 2017-06-19 PROCEDURE — 63600175 PHARM REV CODE 636 W HCPCS: Performed by: SURGERY

## 2017-06-19 PROCEDURE — 88307 TISSUE EXAM BY PATHOLOGIST: CPT | Performed by: PATHOLOGY

## 2017-06-19 PROCEDURE — 3E0J305 INTRODUCTION OF OTHER ANTINEOPLASTIC INTO BILIARY AND PANCREATIC TRACT, PERCUTANEOUS APPROACH: ICD-10-PCS | Performed by: SURGERY

## 2017-06-19 PROCEDURE — 85025 COMPLETE CBC W/AUTO DIFF WBC: CPT

## 2017-06-19 PROCEDURE — 36000708 HC OR TIME LEV III 1ST 15 MIN: Performed by: SURGERY

## 2017-06-19 PROCEDURE — 25000003 PHARM REV CODE 250: Performed by: NURSE PRACTITIONER

## 2017-06-19 PROCEDURE — 25000003 PHARM REV CODE 250: Performed by: ANESTHESIOLOGY

## 2017-06-19 PROCEDURE — 0FB10ZZ EXCISION OF RIGHT LOBE LIVER, OPEN APPROACH: ICD-10-PCS | Performed by: SURGERY

## 2017-06-19 PROCEDURE — 88360 TUMOR IMMUNOHISTOCHEM/MANUAL: CPT | Mod: 26,,, | Performed by: PATHOLOGY

## 2017-06-19 PROCEDURE — 63600175 PHARM REV CODE 636 W HCPCS: Performed by: ANESTHESIOLOGY

## 2017-06-19 PROCEDURE — 27100025 HC TUBING, SET FLUID WARMER: Performed by: NURSE ANESTHETIST, CERTIFIED REGISTERED

## 2017-06-19 PROCEDURE — 25000003 PHARM REV CODE 250: Performed by: NURSE ANESTHETIST, CERTIFIED REGISTERED

## 2017-06-19 PROCEDURE — 36415 COLL VENOUS BLD VENIPUNCTURE: CPT

## 2017-06-19 PROCEDURE — 27201423 OPTIME MED/SURG SUP & DEVICES STERILE SUPPLY: Performed by: SURGERY

## 2017-06-19 PROCEDURE — 37000009 HC ANESTHESIA EA ADD 15 MINS: Performed by: SURGERY

## 2017-06-19 PROCEDURE — 36000709 HC OR TIME LEV III EA ADD 15 MIN: Performed by: SURGERY

## 2017-06-19 PROCEDURE — 27200676 HC TRANSDUCER MONITOR KIT DOUBLE: Performed by: NURSE ANESTHETIST, CERTIFIED REGISTERED

## 2017-06-19 PROCEDURE — 88307 TISSUE EXAM BY PATHOLOGIST: CPT | Mod: 26,,, | Performed by: PATHOLOGY

## 2017-06-19 PROCEDURE — 0F510ZZ DESTRUCTION OF RIGHT LOBE LIVER, OPEN APPROACH: ICD-10-PCS | Performed by: SURGERY

## 2017-06-19 PROCEDURE — 20000000 HC ICU ROOM

## 2017-06-19 PROCEDURE — 80048 BASIC METABOLIC PNL TOTAL CA: CPT

## 2017-06-19 RX ORDER — GLYCOPYRROLATE 0.2 MG/ML
INJECTION INTRAMUSCULAR; INTRAVENOUS
Status: DISCONTINUED | OUTPATIENT
Start: 2017-06-19 | End: 2017-06-19

## 2017-06-19 RX ORDER — MAGNESIUM SULFATE HEPTAHYDRATE 40 MG/ML
2 INJECTION, SOLUTION INTRAVENOUS ONCE
Status: COMPLETED | OUTPATIENT
Start: 2017-06-19 | End: 2017-06-19

## 2017-06-19 RX ORDER — ACETAMINOPHEN 10 MG/ML
1000 INJECTION, SOLUTION INTRAVENOUS EVERY 8 HOURS
Status: DISCONTINUED | OUTPATIENT
Start: 2017-06-19 | End: 2017-06-19

## 2017-06-19 RX ORDER — ROCURONIUM BROMIDE 10 MG/ML
INJECTION, SOLUTION INTRAVENOUS
Status: DISCONTINUED | OUTPATIENT
Start: 2017-06-19 | End: 2017-06-19

## 2017-06-19 RX ORDER — PREGABALIN 75 MG/1
75 CAPSULE ORAL
Status: DISCONTINUED | OUTPATIENT
Start: 2017-06-19 | End: 2017-06-19

## 2017-06-19 RX ORDER — SCOLOPAMINE TRANSDERMAL SYSTEM 1 MG/1
PATCH, EXTENDED RELEASE TRANSDERMAL
Status: DISPENSED
Start: 2017-06-19 | End: 2017-06-19

## 2017-06-19 RX ORDER — NEOSTIGMINE METHYLSULFATE 1 MG/ML
INJECTION, SOLUTION INTRAVENOUS
Status: DISCONTINUED | OUTPATIENT
Start: 2017-06-19 | End: 2017-06-19

## 2017-06-19 RX ORDER — KETOROLAC TROMETHAMINE 30 MG/ML
10 INJECTION, SOLUTION INTRAMUSCULAR; INTRAVENOUS EVERY 6 HOURS
Status: DISCONTINUED | OUTPATIENT
Start: 2017-06-19 | End: 2017-06-21

## 2017-06-19 RX ORDER — DIPHENHYDRAMINE HYDROCHLORIDE 50 MG/ML
12.5 INJECTION INTRAMUSCULAR; INTRAVENOUS EVERY 4 HOURS PRN
Status: DISCONTINUED | OUTPATIENT
Start: 2017-06-19 | End: 2017-06-25 | Stop reason: HOSPADM

## 2017-06-19 RX ORDER — FAMOTIDINE 10 MG/ML
20 INJECTION INTRAVENOUS
Status: COMPLETED | OUTPATIENT
Start: 2017-06-19 | End: 2017-06-19

## 2017-06-19 RX ORDER — PREGABALIN 75 MG/1
75 CAPSULE ORAL 2 TIMES DAILY
Status: DISCONTINUED | OUTPATIENT
Start: 2017-06-19 | End: 2017-06-24

## 2017-06-19 RX ORDER — HYDROMORPHONE HCL IN 0.9% NACL 6 MG/30 ML
PATIENT CONTROLLED ANALGESIA SYRINGE INTRAVENOUS CONTINUOUS
Status: DISCONTINUED | OUTPATIENT
Start: 2017-06-19 | End: 2017-06-21

## 2017-06-19 RX ORDER — ALBUMIN HUMAN 50 G/1000ML
SOLUTION INTRAVENOUS CONTINUOUS PRN
Status: DISCONTINUED | OUTPATIENT
Start: 2017-06-19 | End: 2017-06-19

## 2017-06-19 RX ORDER — SODIUM CHLORIDE, SODIUM LACTATE, POTASSIUM CHLORIDE, CALCIUM CHLORIDE 600; 310; 30; 20 MG/100ML; MG/100ML; MG/100ML; MG/100ML
INJECTION, SOLUTION INTRAVENOUS CONTINUOUS
Status: DISCONTINUED | OUTPATIENT
Start: 2017-06-19 | End: 2017-06-19

## 2017-06-19 RX ORDER — ACETAMINOPHEN 10 MG/ML
1000 INJECTION, SOLUTION INTRAVENOUS EVERY 8 HOURS
Status: COMPLETED | OUTPATIENT
Start: 2017-06-19 | End: 2017-06-20

## 2017-06-19 RX ORDER — PROPOFOL 10 MG/ML
VIAL (ML) INTRAVENOUS
Status: DISCONTINUED | OUTPATIENT
Start: 2017-06-19 | End: 2017-06-19

## 2017-06-19 RX ORDER — LABETALOL HYDROCHLORIDE 5 MG/ML
5 INJECTION, SOLUTION INTRAVENOUS
Status: DISCONTINUED | OUTPATIENT
Start: 2017-06-19 | End: 2017-06-25 | Stop reason: HOSPADM

## 2017-06-19 RX ORDER — HYDROMORPHONE HYDROCHLORIDE 1 MG/ML
0.5 INJECTION, SOLUTION INTRAMUSCULAR; INTRAVENOUS; SUBCUTANEOUS ONCE
Status: COMPLETED | OUTPATIENT
Start: 2017-06-19 | End: 2017-06-19

## 2017-06-19 RX ORDER — ONDANSETRON 2 MG/ML
INJECTION INTRAMUSCULAR; INTRAVENOUS
Status: DISCONTINUED | OUTPATIENT
Start: 2017-06-19 | End: 2017-06-19

## 2017-06-19 RX ORDER — SCOLOPAMINE TRANSDERMAL SYSTEM 1 MG/1
PATCH, EXTENDED RELEASE TRANSDERMAL
Status: DISCONTINUED | OUTPATIENT
Start: 2017-06-19 | End: 2017-06-19

## 2017-06-19 RX ORDER — NALOXONE HCL 0.4 MG/ML
0.02 VIAL (ML) INJECTION
Status: DISCONTINUED | OUTPATIENT
Start: 2017-06-19 | End: 2017-06-25 | Stop reason: HOSPADM

## 2017-06-19 RX ORDER — DEXTROSE MONOHYDRATE, SODIUM CHLORIDE, AND POTASSIUM CHLORIDE 50; 1.49; 4.5 G/1000ML; G/1000ML; G/1000ML
INJECTION, SOLUTION INTRAVENOUS CONTINUOUS
Status: DISCONTINUED | OUTPATIENT
Start: 2017-06-19 | End: 2017-06-24

## 2017-06-19 RX ORDER — ONDANSETRON 2 MG/ML
8 INJECTION INTRAMUSCULAR; INTRAVENOUS EVERY 6 HOURS PRN
Status: DISCONTINUED | OUTPATIENT
Start: 2017-06-19 | End: 2017-06-25 | Stop reason: HOSPADM

## 2017-06-19 RX ORDER — ENOXAPARIN SODIUM 100 MG/ML
30 INJECTION SUBCUTANEOUS
Status: COMPLETED | OUTPATIENT
Start: 2017-06-19 | End: 2017-06-19

## 2017-06-19 RX ORDER — HYDRALAZINE HYDROCHLORIDE 20 MG/ML
5 INJECTION INTRAMUSCULAR; INTRAVENOUS
Status: DISCONTINUED | OUTPATIENT
Start: 2017-06-19 | End: 2017-06-25 | Stop reason: HOSPADM

## 2017-06-19 RX ORDER — METOCLOPRAMIDE HYDROCHLORIDE 5 MG/ML
10 INJECTION INTRAMUSCULAR; INTRAVENOUS EVERY 6 HOURS
Status: COMPLETED | OUTPATIENT
Start: 2017-06-19 | End: 2017-06-22

## 2017-06-19 RX ORDER — MIDAZOLAM HYDROCHLORIDE 1 MG/ML
INJECTION, SOLUTION INTRAMUSCULAR; INTRAVENOUS
Status: DISCONTINUED | OUTPATIENT
Start: 2017-06-19 | End: 2017-06-19

## 2017-06-19 RX ORDER — LIDOCAINE HYDROCHLORIDE 10 MG/ML
1 INJECTION, SOLUTION EPIDURAL; INFILTRATION; INTRACAUDAL; PERINEURAL ONCE
Status: DISCONTINUED | OUTPATIENT
Start: 2017-06-19 | End: 2017-06-19

## 2017-06-19 RX ORDER — BACITRACIN 50000 [IU]/1
INJECTION, POWDER, FOR SOLUTION INTRAMUSCULAR
Status: DISCONTINUED | OUTPATIENT
Start: 2017-06-19 | End: 2017-06-19 | Stop reason: HOSPADM

## 2017-06-19 RX ORDER — BUPIVACAINE HYDROCHLORIDE 2.5 MG/ML
INJECTION, SOLUTION EPIDURAL; INFILTRATION; INTRACAUDAL
Status: DISCONTINUED | OUTPATIENT
Start: 2017-06-19 | End: 2017-06-19 | Stop reason: HOSPADM

## 2017-06-19 RX ORDER — THYROID 30 MG/1
90 TABLET ORAL DAILY
Status: DISCONTINUED | OUTPATIENT
Start: 2017-06-20 | End: 2017-06-25 | Stop reason: HOSPADM

## 2017-06-19 RX ORDER — PROMETHAZINE HYDROCHLORIDE 25 MG/ML
INJECTION, SOLUTION INTRAMUSCULAR; INTRAVENOUS
Status: DISPENSED
Start: 2017-06-19 | End: 2017-06-19

## 2017-06-19 RX ORDER — LIDOCAINE HYDROCHLORIDE 20 MG/ML
INJECTION, SOLUTION EPIDURAL; INFILTRATION; INTRACAUDAL; PERINEURAL
Status: DISCONTINUED | OUTPATIENT
Start: 2017-06-19 | End: 2017-06-19

## 2017-06-19 RX ORDER — ONDANSETRON 2 MG/ML
8 INJECTION INTRAMUSCULAR; INTRAVENOUS
Status: COMPLETED | OUTPATIENT
Start: 2017-06-19 | End: 2017-06-19

## 2017-06-19 RX ORDER — OCTREOTIDE ACETATE 50 UG/ML
INJECTION, SOLUTION INTRAVENOUS; SUBCUTANEOUS
Status: DISCONTINUED | OUTPATIENT
Start: 2017-06-19 | End: 2017-06-19

## 2017-06-19 RX ORDER — ALBUTEROL SULFATE 0.83 MG/ML
2.5 SOLUTION RESPIRATORY (INHALATION) EVERY 4 HOURS PRN
Status: DISCONTINUED | OUTPATIENT
Start: 2017-06-19 | End: 2017-06-25 | Stop reason: HOSPADM

## 2017-06-19 RX ORDER — FENTANYL CITRATE 50 UG/ML
INJECTION, SOLUTION INTRAMUSCULAR; INTRAVENOUS
Status: DISCONTINUED | OUTPATIENT
Start: 2017-06-19 | End: 2017-06-19

## 2017-06-19 RX ORDER — KETOROLAC TROMETHAMINE 30 MG/ML
15 INJECTION, SOLUTION INTRAMUSCULAR; INTRAVENOUS
Status: DISCONTINUED | OUTPATIENT
Start: 2017-06-19 | End: 2017-06-19

## 2017-06-19 RX ORDER — PHENYLEPHRINE HYDROCHLORIDE 10 MG/ML
INJECTION INTRAVENOUS
Status: DISCONTINUED | OUTPATIENT
Start: 2017-06-19 | End: 2017-06-19

## 2017-06-19 RX ORDER — MANNITOL 250 MG/ML
INJECTION, SOLUTION INTRAVENOUS
Status: DISCONTINUED | OUTPATIENT
Start: 2017-06-19 | End: 2017-06-19

## 2017-06-19 RX ADMIN — OCTREOTIDE ACETATE 125 MCG/HR: 1000 INJECTION, SOLUTION INTRAVENOUS; SUBCUTANEOUS at 02:06

## 2017-06-19 RX ADMIN — PHENYLEPHRINE HYDROCHLORIDE 100 MCG: 10 INJECTION INTRAVENOUS at 12:06

## 2017-06-19 RX ADMIN — MANNITOL 12.5 G: 12.5 INJECTION, SOLUTION INTRAVENOUS at 11:06

## 2017-06-19 RX ADMIN — HYDROCORTISONE SODIUM SUCCINATE 50 MG: 100 INJECTION, POWDER, FOR SOLUTION INTRAMUSCULAR; INTRAVENOUS at 07:06

## 2017-06-19 RX ADMIN — FENTANYL CITRATE 100 MCG: 50 INJECTION, SOLUTION INTRAMUSCULAR; INTRAVENOUS at 09:06

## 2017-06-19 RX ADMIN — IRON SUCROSE 100 MG: 20 INJECTION, SOLUTION INTRAVENOUS at 03:06

## 2017-06-19 RX ADMIN — Medication: at 05:06

## 2017-06-19 RX ADMIN — ROCURONIUM BROMIDE 50 MG: 10 INJECTION, SOLUTION INTRAVENOUS at 08:06

## 2017-06-19 RX ADMIN — AMPICILLIN SODIUM AND SULBACTAM SODIUM 3 G: 2; 1 INJECTION, POWDER, FOR SOLUTION INTRAMUSCULAR; INTRAVENOUS at 12:06

## 2017-06-19 RX ADMIN — FENTANYL CITRATE 50 MCG: 50 INJECTION, SOLUTION INTRAMUSCULAR; INTRAVENOUS at 09:06

## 2017-06-19 RX ADMIN — PREGABALIN 75 MG: 75 CAPSULE ORAL at 08:06

## 2017-06-19 RX ADMIN — PHENYLEPHRINE HYDROCHLORIDE 100 MCG: 10 INJECTION INTRAVENOUS at 11:06

## 2017-06-19 RX ADMIN — ONDANSETRON 8 MG: 2 INJECTION, SOLUTION INTRAMUSCULAR; INTRAVENOUS at 12:06

## 2017-06-19 RX ADMIN — PHENYLEPHRINE HYDROCHLORIDE 100 MCG: 10 INJECTION INTRAVENOUS at 10:06

## 2017-06-19 RX ADMIN — KETOROLAC TROMETHAMINE 15 MG: 30 INJECTION, SOLUTION INTRAMUSCULAR at 07:06

## 2017-06-19 RX ADMIN — KETOROLAC TROMETHAMINE 10 MG: 30 INJECTION, SOLUTION INTRAMUSCULAR at 05:06

## 2017-06-19 RX ADMIN — SODIUM CHLORIDE 3 G: 9 INJECTION, SOLUTION INTRAVENOUS at 02:06

## 2017-06-19 RX ADMIN — ONDANSETRON 8 MG: 2 INJECTION INTRAMUSCULAR; INTRAVENOUS at 03:06

## 2017-06-19 RX ADMIN — AMPICILLIN SODIUM AND SULBACTAM SODIUM 3 G: 2; 1 INJECTION, POWDER, FOR SOLUTION INTRAMUSCULAR; INTRAVENOUS at 09:06

## 2017-06-19 RX ADMIN — METOCLOPRAMIDE 10 MG: 5 INJECTION, SOLUTION INTRAMUSCULAR; INTRAVENOUS at 11:06

## 2017-06-19 RX ADMIN — KETAMINE HYDROCHLORIDE 4 MCG/KG/MIN: 100 INJECTION, SOLUTION, CONCENTRATE INTRAMUSCULAR; INTRAVENOUS at 09:06

## 2017-06-19 RX ADMIN — FENTANYL CITRATE 50 MCG: 50 INJECTION, SOLUTION INTRAMUSCULAR; INTRAVENOUS at 10:06

## 2017-06-19 RX ADMIN — SODIUM CHLORIDE 3 G: 9 INJECTION, SOLUTION INTRAVENOUS at 08:06

## 2017-06-19 RX ADMIN — SODIUM CHLORIDE, SODIUM LACTATE, POTASSIUM CHLORIDE, AND CALCIUM CHLORIDE: .6; .31; .03; .02 INJECTION, SOLUTION INTRAVENOUS at 07:06

## 2017-06-19 RX ADMIN — GLYCOPYRROLATE 0.8 MG: 0.2 INJECTION, SOLUTION INTRAMUSCULAR; INTRAVENOUS at 12:06

## 2017-06-19 RX ADMIN — SODIUM CHLORIDE, SODIUM LACTATE, POTASSIUM CHLORIDE, AND CALCIUM CHLORIDE: .6; .31; .03; .02 INJECTION, SOLUTION INTRAVENOUS at 08:06

## 2017-06-19 RX ADMIN — DEXTROSE MONOHYDRATE, SODIUM CHLORIDE, AND POTASSIUM CHLORIDE: 50; 4.5; 1.49 INJECTION, SOLUTION INTRAVENOUS at 03:06

## 2017-06-19 RX ADMIN — HYDROMORPHONE HYDROCHLORIDE 0.5 MG: 1 INJECTION, SOLUTION INTRAMUSCULAR; INTRAVENOUS; SUBCUTANEOUS at 05:06

## 2017-06-19 RX ADMIN — PHENYLEPHRINE HYDROCHLORIDE 100 MCG: 10 INJECTION INTRAVENOUS at 01:06

## 2017-06-19 RX ADMIN — SCOPOLAMINE 1 PATCH: 1 PATCH, EXTENDED RELEASE TRANSDERMAL at 08:06

## 2017-06-19 RX ADMIN — ENOXAPARIN SODIUM 30 MG: 100 INJECTION SUBCUTANEOUS at 07:06

## 2017-06-19 RX ADMIN — ALBUMIN HUMAN: 0.05 INJECTION, SOLUTION INTRAVENOUS at 11:06

## 2017-06-19 RX ADMIN — ONDANSETRON 8 MG: 2 INJECTION INTRAMUSCULAR; INTRAVENOUS at 07:06

## 2017-06-19 RX ADMIN — OCTREOTIDE ACETATE 500 MCG/HR: 1000 INJECTION, SOLUTION INTRAVENOUS; SUBCUTANEOUS at 08:06

## 2017-06-19 RX ADMIN — OCTREOTIDE ACETATE 250 MCG: 50 INJECTION, SOLUTION INTRAVENOUS; SUBCUTANEOUS at 11:06

## 2017-06-19 RX ADMIN — ERYTHROPOIETIN 20000 UNITS: 20000 INJECTION, SOLUTION INTRAVENOUS; SUBCUTANEOUS at 02:06

## 2017-06-19 RX ADMIN — OCTREOTIDE ACETATE 500 MCG: 500 INJECTION, SOLUTION INTRAVENOUS; SUBCUTANEOUS at 08:06

## 2017-06-19 RX ADMIN — LIDOCAINE HYDROCHLORIDE 60 MG: 20 INJECTION, SOLUTION EPIDURAL; INFILTRATION; INTRACAUDAL; PERINEURAL at 08:06

## 2017-06-19 RX ADMIN — CALCIUM GLUCONATE 1 G: 94 INJECTION, SOLUTION INTRAVENOUS at 02:06

## 2017-06-19 RX ADMIN — KETOROLAC TROMETHAMINE 10 MG: 30 INJECTION, SOLUTION INTRAMUSCULAR at 11:06

## 2017-06-19 RX ADMIN — PROMETHAZINE HYDROCHLORIDE 6.25 MG: 25 INJECTION INTRAMUSCULAR; INTRAVENOUS at 08:06

## 2017-06-19 RX ADMIN — PREGABALIN 75 MG: 75 CAPSULE ORAL at 07:06

## 2017-06-19 RX ADMIN — PROPOFOL 100 MG: 10 INJECTION, EMULSION INTRAVENOUS at 08:06

## 2017-06-19 RX ADMIN — FENTANYL CITRATE 150 MCG: 50 INJECTION, SOLUTION INTRAMUSCULAR; INTRAVENOUS at 11:06

## 2017-06-19 RX ADMIN — MIDAZOLAM 5 MG: 1 INJECTION INTRAMUSCULAR; INTRAVENOUS at 08:06

## 2017-06-19 RX ADMIN — ROCURONIUM BROMIDE 30 MG: 10 INJECTION, SOLUTION INTRAVENOUS at 11:06

## 2017-06-19 RX ADMIN — FAMOTIDINE 20 MG: 10 INJECTION, SOLUTION INTRAVENOUS at 07:06

## 2017-06-19 RX ADMIN — CALCIUM GLUCONATE 1 G: 94 INJECTION, SOLUTION INTRAVENOUS at 03:06

## 2017-06-19 RX ADMIN — OCTREOTIDE ACETATE 100 MCG: 50 INJECTION, SOLUTION INTRAVENOUS; SUBCUTANEOUS at 10:06

## 2017-06-19 RX ADMIN — NEOSTIGMINE METHYLSULFATE 5 MG: 1 INJECTION INTRAVENOUS at 12:06

## 2017-06-19 RX ADMIN — METOCLOPRAMIDE 10 MG: 5 INJECTION, SOLUTION INTRAMUSCULAR; INTRAVENOUS at 05:06

## 2017-06-19 RX ADMIN — FENTANYL CITRATE 50 MCG: 50 INJECTION, SOLUTION INTRAMUSCULAR; INTRAVENOUS at 11:06

## 2017-06-19 RX ADMIN — MAGNESIUM SULFATE HEPTAHYDRATE 2 G: 40 INJECTION, SOLUTION INTRAVENOUS at 02:06

## 2017-06-19 RX ADMIN — ACETAMINOPHEN 1000 MG: 10 INJECTION, SOLUTION INTRAVENOUS at 08:06

## 2017-06-19 RX ADMIN — ACETAMINOPHEN 1000 MG: 10 INJECTION, SOLUTION INTRAVENOUS at 07:06

## 2017-06-19 RX ADMIN — FENTANYL CITRATE 150 MCG: 50 INJECTION, SOLUTION INTRAMUSCULAR; INTRAVENOUS at 08:06

## 2017-06-19 NOTE — ANESTHESIA POSTPROCEDURE EVALUATION
"Anesthesia Post Evaluation    Patient: Krysten Muhammad    Procedure(s) Performed: Procedure(s) (LRB):  EXPLORATORY-LAPAROTOMY (N/A)  RESECTION-HEPATIC (N/A)  LYSIS-ADHESION (N/A)    Final Anesthesia Type: general  Patient location during evaluation: PACU  Patient participation: Yes- Able to Participate  Level of consciousness: awake and alert  Post-procedure vital signs: reviewed and stable  Pain management: adequate  Airway patency: patent  PONV status at discharge: No PONV  Anesthetic complications: no      Cardiovascular status: hemodynamically stable  Respiratory status: unassisted  Hydration status: euvolemic  Follow-up not needed.        Visit Vitals  /62   Pulse 72   Temp 36.5 °C (97.7 °F) (Oral)   Resp 10   Ht 5' 3" (1.6 m)   Wt 68.5 kg (151 lb)   SpO2 100%   Breastfeeding? No   BMI 26.75 kg/m²       Pain/Jose L Score: Pain Assessment Performed: Yes (6/19/2017  3:30 PM)  Presence of Pain: denies (6/19/2017  3:30 PM)  Pain Rating Prior to Med Admin: 0 (6/19/2017  7:33 AM)      "

## 2017-06-19 NOTE — ANESTHESIA PROCEDURE NOTES
Arterial    Diagnosis: carcinoid    Patient location during procedure: done in OR  Procedure start time: 6/19/2017 8:40 AM  Timeout: 6/19/2017 8:40 AM  Procedure end time: 6/19/2017 8:45 AM  Staffing  Anesthesiologist: DESIREE WARD  Resident/CRNA: PRINCE MOSS  Other anesthesia staff: CLAU HODGE  Performed: other anesthesia staff   Anesthesiologist was present at the time of the procedure.  Preanesthetic Checklist  Completed: patient identified, site marked, surgical consent, pre-op evaluation, timeout performed, IV checked, risks and benefits discussed, monitors and equipment checked and anesthesia consent givenArterial  Skin Prep: chlorhexidine gluconate  Local Infiltration: none  Orientation: right  Location: radial  Catheter Size: 20 G  Catheter placement by Ultrasound guidance and Anatomical landmarks. Heme positive aspiration all ports.  Vessel Caliber: medium, patent, compressibility normal  Vascular Doppler:  not doneInsertion Attempts: 2  Assessment  Dressing: secured with tape and tegaderm  Patient: Tolerated well

## 2017-06-19 NOTE — INTERVAL H&P NOTE
The patient has been examined and the H&P has been reviewed:    I concur with the findings and no changes have occurred since H&P was written.    Anesthesia/Surgery risks, benefits and alternative options discussed and understood by patient/family.          Active Hospital Problems    Diagnosis  POA    Metastatic malignant carcinoid tumor to liver [C7B.02]  Yes      Resolved Hospital Problems    Diagnosis Date Resolved POA   No resolved problems to display.

## 2017-06-19 NOTE — TRANSFER OF CARE
"Anesthesia Transfer of Care Note    Patient: Krysten Muhammad    Procedure(s) Performed: Procedure(s) (LRB):  EXPLORATORY-LAPAROTOMY (N/A)  RESECTION-HEPATIC (N/A)  LYSIS-ADHESION (N/A)    Patient location: ICU    Anesthesia Type: general    Transport from OR: Transported from OR on 6-10 L/min O2 by face mask with adequate spontaneous ventilation. Continuous ECG monitoring in transport. Continuous SpO2 monitoring in transport. Continuos invasive BP monitoring in transport    Post pain: adequate analgesia    Post assessment: no apparent anesthetic complications    Post vital signs: stable    Level of consciousness: awake    Nausea/Vomiting: no nausea/vomiting    Complications: none    Transfer of care protocol was followed      Last vitals:   Visit Vitals  BP (!) 118/57 (BP Location: Right arm, Patient Position: Lying, BP Method: Automatic)   Pulse (!) 57   Temp 37 °C (98.6 °F) (Oral)   Resp 18   Ht 5' 3" (1.6 m)   Wt 68.5 kg (151 lb)   SpO2 96%   Breastfeeding? No   BMI 26.75 kg/m²     "

## 2017-06-19 NOTE — OP NOTE
Dictation #1  MRN:[id number]  [facility]:[id number]    #[id number]    Operation Form for [facility] Database    Name __ _Krysten Muhammad                    Date of Birth __1960 _____    Patient Admission Date to hospital:   6/19/2017    Surgeon(s):  CIARA Hong MD                     _JUANCARLOS MCFARLADN________       Length of Operation: 4.5H  Type of Operation (check all that apply)     Procedure(s):  EXPLORATORY-LAPAROTOMY  RESECTION-HEPATIC SEGMENT V,   LIVER RESECTION SEGMENT VIII ANTERIOR,  LIVER RESECTION SEGMENT VIII POSTERIOR  RFA LIVER SEGMENT V  RFA LIVER SEGMENT VI  INTRALESIONAL CHEMOTX  INTRAOP U/S  LYSIS-ADHESIONS  UNLISTED PROCEDURE ABDOMEN    DRAIN: RUQ LUKASZ    COMP 0     Gastric Resection                    Small bowel resection                  Colon resection    Hepatic resection                     Pancreatic resection                    Whipple                   Lymph node resection             Cholecystectomy                          Adrenalectomy       Lung resection                          Mesenteric dissection                   Nephrectomy         Thyroidectomy                         RFA                                             Peritoneal stripping    Explorative                               Lysis of Adhesions              Diaphramatic Resection   Other  _____________________________________        Type of vascular encasements (check all that apply)      SMA        Renal        Aorta/Cava     Iliac       Ifrah  Ifrah Hepatis    Celiac      Was tumor collected?           XYes                 No          If yes, tumor form must be completed by Data staff.                   Neoprobe Used          Yes          X No    Was it helpful in finding the tumor?      Yes        No     Operative Findings   Vascular Encasement                                       Mesenteric Extension     Bowel Obstruction - complete or partial    Bowel Ischemia                                                  Carcinomatosis     Location of Primary Tumor        NA                             Primary Resected     Yes      No   How many primary sites?         % of Tumor Debulked  100                  %of Mesenteric Tumor Debulked___NA______  Was sentinel lymph node done?      Yes       XNo    Number of [place] [address] Sent ______         Number of [place] [address] Positive   __        Evidence of lymph obstruction:    Yes      XNo    Mets to other organs:       XYes LIVER         No                                            If yes, Nodes and Metastases form must be completed  Number of tumors found: _____5_________   Was tumor left behind? _______NO_______   How much small bowel removed (cm)?  _NA________    Seprafilm used:    Yes      X No  Visible ovarian masses:             Yes       X No   Visible retroperitoneal nodes:   Yes        X No    Preop Sandostatin used:          X Yes        No    Intraop chemotherapy used:     X Yes         No      Gallstones present :   Yes          No    X N/A  Visible liver mets:      XX Yes         No      Blood transfusion needed:    Yes        X No  Complications_________________0________________________    Nodes & Metastases Form    Nodes    Resected   Mets  Tissue Resected?      Solid organ/soft     Cervical   Yes No                     X Liver   XYes No     Supraclavicular  Yes No   Bone   Yes No     Hilar           Yes No Brain   Yes No        Mediastinal  Yes No  Lung    Yes No     Auxiliary     Yes No Colon   Yes No     Mesenteric Yes No Pancreas  Yes No      Periportal Yes No Appendix  Yes No     Retroperitoneal Yes No  Thyroid     Yes No     Celiac Yes No Chest wall  Yes No     Inguinal Yes No  Kidney       Yes   No     Iliac Yes No Breast       Yes   No     Other Yes No Ovary        Yes No   ___________________ Pelvic Floor   Yes   No   Uterus         Yes No   Ureter         Yes   No   Seminal Vesicle YesNo      Spleen     Yes No   L Diaphram   Yes No   R Diaphram   Yes No    Other              Yes No   ____________________    Radiofrequency Ablation Form      Mode: Site:     X Open   X Liver- R Lobe                        Percutaneous   Liver- L Lobe     Laproscopic    Kidney        Bone        Pelvis         Other  ____________________                                      #of Lesions 2     Tumor Sizes  _____1 CM___________ _______________  _____1 CM___________ _______________  ________________ _______________  ________________ _______________    Complications?   YES X NO  UNKNOWN    Complications____________________________________________________________________________________________________________________________________      [place] Form      Indication: ________________________________________________________________________________________________________________   LIVER   R lobe size   L lobe size          Hepatic melissa tumor sizes  _______       _______      _______   _______      Left tumor size_______      _______       _______      _______      _______      Right tumor size_______       _______       _______      _______  _______      Middle tumor size ______     Single probe                  2 Probe           3 Probes            4 Probes   Overlaping ablation       1        2         3        4     HILUM   Central duct-- tumor sizes ________     ________     ________   R duct--     tumor sizes _______       ________      ________   L duct--  tumor sizes _______      ________       ________       MESENTERIC ROOT NODES--tumor sizes _____________      URETER-- tumor sizes _____________     PERIAORTIC-- tumor sizes _______     ________    ________     PELVIC TUMOR  Obturator node         tumor sizes ________     ________     ________   Pres  tumor sizes ________     ________     _______   Other  tumor size _____________      PANCREAS--  tumor size ______                              Head     tumor size _______                            Body      tumor size _______                                                          Tail        tumor size _______     KIDNEY-- tumor size ________   Right        Left       OTHER SITES   _________________        size_____      _________________         size_____     _____ ____________        size_____    Ablation time___________        Complications?        Yes      No   Unknown    Complications/Comments:__________________________________________________________________________________________________________________________

## 2017-06-19 NOTE — ANESTHESIA PROCEDURE NOTES
Central Line    Diagnosis: carcinoid  Patient location during procedure: done in OR  Procedure start time: 6/19/2017 8:40 AM  Timeout: 6/19/2017 8:40 AM  Procedure end time: 6/19/2017 8:55 AM  Staffing  Anesthesiologist: DESIREE WARD  Resident/CRNA: JANES MCMAHAN  Performed: resident/CRNA   Anesthesiologist was present at the time of the procedure.  Preanesthetic Checklist  Completed: patient identified, site marked, surgical consent, pre-op evaluation, timeout performed, IV checked, risks and benefits discussed, monitors and equipment checked and anesthesia consent given  Indication  Indication: hemodynamic monitoring, vascular access, med administration     Anesthesia   general anesthesia    Central Line  Skin Prep: skin prepped with ChloraPrep, skin prep agent completely dried prior to procedure  maximum sterile barriers used during central venous catheter insertion  hand hygiene performed prior to central venous catheter insertion  Location: right internal jugular,   Catheter type: triple lumen  Catheter Size: 7 Fr  Inserted Catheter Length: 14 cm  Ultrasound: vascular probe with ultrasound  Vessel Caliber: medium, patent  Vascular Doppler:  not done, compressibility normal  Needle advanced into vessel with real time Ultrasound guidance.  Guidewire confirmed in vessel.  Sterile sheath used.  Image recorded and saved.  Manometry: none  Insertion Attempts: 1   Securement:line sutured, chlorhexidine patch, sterile dressing applied and blood return through all ports     Post-Procedure  Adverse Events:none

## 2017-06-19 NOTE — H&P (VIEW-ONLY)
"NOLANETS:  [facility]  A collaboration between [facility], formerly [facility] and [facility]      PATIENT: Krysetn Muhammad  MRN: [id number]  DATE: 6/16/2017    Subjective:      Chief Complaint: Follow-up (sign consents and preop pending sa)      Vitals:   Vitals:    06/16/17 1013   BP: 114/67   Pulse: 67   Temp: 97.2 °F (36.2 °C)   TempSrc: Oral   Weight: 68.9 kg (151 lb 12.8 oz)   Height: 5' 3" (1.6 m)        Karnofsky Score:   Karnofsky Score    Karnofsky Score:  90% - Able to Carry on Normal Activity: Minor Symptoms of Disease          Diagnosis:   1. 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 Malignant carcinoid tumor of the ileum         Oncologic History: s/p TI carcinoid 2012/2013.      Interval History: Ms. 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 10/2012    Secondary neuroendocrine tumor of distant lymph nodes 2012       Past Surgical History:  Past Surgical History:   Procedure Laterality Date    breast reduction  and implants  2012    SBR, liver r/s, gall bladder  07/2013    Ki 67- 2%    SBR, lymph node r/s  10/2012    found carcinoid with colon obstruction       Family History:  Family History   Problem Relation Age of Onset    Carcinoid Cancer Mother        Allergies:  Review of patient's allergies indicates:   Allergen Reactions    Ciprofloxacin Other (See Comments)     Nausea and joint pain      Dilaudid [hydromorphone (bulk)] Nausea Only    Epinephrine Other (See Comments)     Carcinoid patient    Levaquin [levofloxacin] Other (See Comments)     Nausea and joint pain    Morphine Nausea And " Vomiting    Ciprofloxacin hcl     Hydromorphone Nausea Only    Quinolones        Medications:  Current Outpatient Prescriptions   Medication Sig Dispense Refill    estradiol (ESTRACE) 1 MG tablet Take 1 mg by mouth once daily.      MYRBETRIQ 50 mg Tb24       progesterone (PROMETRIUM) 200 MG capsule Take 200 mg by mouth every evening.      thyroid, pork, (ARMOUR THYROID) 90 mg Tab Take 90 mg by mouth once daily.      trospium (SANCTURA) 20 mg Tab tablet        No current facility-administered medications for this visit.        Review of Systems   Constitutional: Positive for fatigue. Negative for activity change, appetite change, chills, fever and unexpected weight change.   HENT: Negative.  Negative for congestion, ear pain, rhinorrhea and sinus pressure.    Eyes: Negative.  Negative for photophobia, pain and redness.   Respiratory: Negative.  Negative for cough, chest tightness, shortness of breath and wheezing.    Cardiovascular: Negative for chest pain, palpitations and leg swelling.   Gastrointestinal: Negative.  Negative for abdominal distention, abdominal pain, anal bleeding, blood in stool, constipation, diarrhea, nausea, rectal pain and vomiting.   Endocrine: Negative.  Negative for cold intolerance, heat intolerance, polydipsia, polyphagia and polyuria.   Genitourinary: Negative.  Negative for difficulty urinating, dysuria and frequency.   Musculoskeletal: Negative.  Negative for arthralgias, back pain, gait problem, myalgias, neck pain and neck stiffness.   Skin: Positive for color change (flushing). Negative for pallor and rash.   Allergic/Immunologic: Negative.  Negative for environmental allergies, food allergies and immunocompromised state.   Neurological: Negative.  Negative for dizziness, syncope, weakness and light-headedness.   Hematological: Negative.  Negative for adenopathy. Does not bruise/bleed easily.   Psychiatric/Behavioral: Negative.  Negative for agitation, behavioral problems,  confusion, decreased concentration, dysphoric mood, hallucinations, self-injury, sleep disturbance and suicidal ideas. The patient is not nervous/anxious and is not hyperactive.       Objective:      Physical Exam   Constitutional: She is oriented to person, place, and time. She appears well-developed and well-nourished. No distress.   HENT:   Head: Normocephalic and atraumatic.   Mouth/Throat: Oropharynx is clear and moist.   Eyes: EOM are normal. Pupils are equal, round, and reactive to light. No scleral icterus.   Neck: Normal range of motion. Neck supple. No thyromegaly present.   Cardiovascular: Normal rate, regular rhythm, normal heart sounds and intact distal pulses.  Exam reveals no gallop.    No murmur heard.  Pulmonary/Chest: Effort normal and breath sounds normal. No respiratory distress. She has no wheezes. She has no rales.   Abdominal: Soft. Bowel sounds are normal. She exhibits no distension and no mass. There is no tenderness. There is no rebound and no guarding. No hernia. Hernia confirmed negative in the ventral area.   Well healed scar   Musculoskeletal: Normal range of motion. She exhibits no edema or tenderness.   Lymphadenopathy:        Head (right side): No submandibular adenopathy present.        Head (left side): No submandibular adenopathy present.     She has no cervical adenopathy.        Right cervical: No superficial cervical adenopathy present.       Left cervical: No superficial cervical adenopathy present.        Right axillary: No pectoral and no lateral adenopathy present.        Left axillary: No pectoral and no lateral adenopathy present.       Right: No inguinal and no supraclavicular adenopathy present.        Left: No inguinal and no supraclavicular adenopathy present.   Neurological: She is alert and oriented to person, place, and time. She has normal reflexes. No cranial nerve deficit.   Skin: Skin is warm, dry and intact. No rash noted. She is not diaphoretic. No erythema.  No pallor.   Psychiatric: She has a normal mood and affect. Her behavior is normal. Thought content normal.   Nursing note and vitals reviewed.     Assessment:       1. 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 Malignant carcinoid tumor of the ileum        Laboratory Data:    Neuroendocrine Labs Latest Ref Rng & Units 6/16/2017 5/30/2017 5/29/2017 5/29/2017 5/9/2017 4/3/2017 2/9/2017   EXT 5 HIAA BLOOD 0 - 22 ng/ml - - - - - - 25(A)   EXT SEROTONIN 56.0 - 244.0 ng/ml - - - - 817(A) - 655(A)   EXT CHROMOGRANIN A 0.0 - 95.0 ng/ml - - - - 270(A) - 177(A)   EXT PANCREASTATIN JERE 0.0 - 88.0 - - - - - - 70   EXT NEUROKININ A JERE 0 - 40 pg/ml - - - - 15 - 20   VITAMIN B12 180 - 914 ng/L - - 185 - - - -   EXT VITAMIN B12 211.0 - 911.0 - - - - - 396 -   EXT FREE T4 4.5 - 10.9 - - - - - 9.3 -   EXT FREE T3 22.5 - 37.0 - - - - - 33.0 -   EXT TSH 0.55 - 4.78 - - - - - 0.023(A) -   EXT 25 HYDROXY VIT D2 - - - - - - - <4.0   EXT WBC 4.8 - 10.8 - - - - - 4.5(A) -   EXT HGB 12.0 - 16.0 - - - - - 13.4 -   EXT HCT 37.0 - 47.0 - - - - - 39.5 -   EXT PLATLETS 130.0 - 400.0 - - - - - 260 -   EXT GLUCOSE 74.0 - 106.0 - - - - - 71(A) -   EXT BUN 7.0 - 18.0 - - - - - 13 -   EXT CREATININE 0.6 - 1.3 - - - - - 1.0 -   EXT .0 - 145.0 - - - - - 141 -   EXT K 3.5 - 5.1 - - - - - 3.8 -   EXT CHLORIDE 98.0 - 107.0 - - - - - 104 -   EXT CO2 21.0 - 32.0 - - - - - 27 -   EXT CALCIUM 8.5 - 10.1 - - - - - 8.8 -   EXT PROTEIN, TOTAL 6.4 - 8.2 - - - - - 7.5 -   EXT ALBUMIN 3.4 - 5.0 - - - - - 3.8 -   EXT TOTAL BILIRUBIN 0.2 - 1.0 - - - - - 0.3 -   EXT ALK PHOSPHATASE 46.0 - 116.0 - - - - - 82 -   EXT SGOT (AST) 15.0 - 37.0 - - - - - 23 -   EXT ALT 14.0 - 59.0 - - - - - 35 -   EXT LDH 81.0 - 234.0 - - - - - - 204.0   EXT TRIGLYCERIDES 35.0 - 160.0 - - - - - - 131   EXT CHOLESETEROL 140.0 - 210.0 - - - - - - 240(A)   EXT HDL - - - - - - - 62   EXT LDL 0.0 - 100.0 - - - - - - 151.8(A)   EXT MG 1.8 - 2.4 - - - - - - 2.10   Weight - 151 lbs  13 oz 155 lbs - 155 lbs - - -     GA68:        Impression: Familial carcinoid syndrome with liver mets  Possible lesion in spleen negativwe on ga68. .  History of intolerance to higher dose Sandostatin and lanreotide.  Not on therapy at present. liver metastases show progression  Plan:       Ga 68 scan reviewed. 3 lesions in liver viz.  4 on MRI.  Scans reviewed and discussed with patient  Multiple questions  Would like tissue saved for outside studies  looks resectable  1 hour counseling   Risks/benefits explained and accepted.  All questions answered.            CIARA Hong MD, FACS  Professor of Surgery, [facility]  Neuroendocrine Surgery, Hepatic/Pancreatic & General Surgery  [address], [address]  [place]  [address]  ph. [phone]; [phone]  fax. [phone]

## 2017-06-20 LAB
ALBUMIN SERPL BCP-MCNC: 2.7 G/DL
ALP SERPL-CCNC: 62 U/L
ALT SERPL W/O P-5'-P-CCNC: 778 U/L
ANION GAP SERPL CALC-SCNC: 3 MMOL/L
AST SERPL-CCNC: 750 U/L
BASOPHILS # BLD AUTO: 0 K/UL
BASOPHILS NFR BLD: 0 %
BILIRUB SERPL-MCNC: 0.6 MG/DL
BUN SERPL-MCNC: 5 MG/DL
CALCIUM SERPL-MCNC: 7.4 MG/DL
CHLORIDE SERPL-SCNC: 105 MMOL/L
CO2 SERPL-SCNC: 27 MMOL/L
CREAT SERPL-MCNC: 0.7 MG/DL
DIFFERENTIAL METHOD: ABNORMAL
EOSINOPHIL # BLD AUTO: 0 K/UL
EOSINOPHIL NFR BLD: 0.6 %
ERYTHROCYTE [DISTWIDTH] IN BLOOD BY AUTOMATED COUNT: 12 %
EST. GFR  (AFRICAN AMERICAN): >60 ML/MIN/1.73 M^2
EST. GFR  (NON AFRICAN AMERICAN): >60 ML/MIN/1.73 M^2
GLUCOSE SERPL-MCNC: 135 MG/DL
HCT VFR BLD AUTO: 21.6 %
HGB BLD-MCNC: 7.3 G/DL
LYMPHOCYTES # BLD AUTO: 0.7 K/UL
LYMPHOCYTES NFR BLD: 13.8 %
MAGNESIUM SERPL-MCNC: 2 MG/DL
MCH RBC QN AUTO: 32 PG
MCHC RBC AUTO-ENTMCNC: 33.8 %
MCV RBC AUTO: 95 FL
MONOCYTES # BLD AUTO: 0.5 K/UL
MONOCYTES NFR BLD: 10.8 %
NEUTROPHILS # BLD AUTO: 3.7 K/UL
NEUTROPHILS NFR BLD: 74.8 %
PHOSPHATE SERPL-MCNC: 2.5 MG/DL
PLATELET # BLD AUTO: 155 K/UL
PMV BLD AUTO: 10 FL
POTASSIUM SERPL-SCNC: 4 MMOL/L
PROT SERPL-MCNC: 4.5 G/DL
RBC # BLD AUTO: 2.28 M/UL
SODIUM SERPL-SCNC: 135 MMOL/L
WBC # BLD AUTO: 4.93 K/UL

## 2017-06-20 PROCEDURE — 85025 COMPLETE CBC W/AUTO DIFF WBC: CPT

## 2017-06-20 PROCEDURE — 94761 N-INVAS EAR/PLS OXIMETRY MLT: CPT

## 2017-06-20 PROCEDURE — 11000001 HC ACUTE MED/SURG PRIVATE ROOM

## 2017-06-20 PROCEDURE — 25000003 PHARM REV CODE 250: Performed by: SURGERY

## 2017-06-20 PROCEDURE — 97116 GAIT TRAINING THERAPY: CPT | Performed by: PHYSICAL THERAPIST

## 2017-06-20 PROCEDURE — 97165 OT EVAL LOW COMPLEX 30 MIN: CPT

## 2017-06-20 PROCEDURE — 27000221 HC OXYGEN, UP TO 24 HOURS

## 2017-06-20 PROCEDURE — 84100 ASSAY OF PHOSPHORUS: CPT

## 2017-06-20 PROCEDURE — 94770 HC EXHALED C02 TEST: CPT

## 2017-06-20 PROCEDURE — G8978 MOBILITY CURRENT STATUS: HCPCS | Mod: CK | Performed by: PHYSICAL THERAPIST

## 2017-06-20 PROCEDURE — 63600175 PHARM REV CODE 636 W HCPCS: Performed by: SURGERY

## 2017-06-20 PROCEDURE — 97162 PT EVAL MOD COMPLEX 30 MIN: CPT | Performed by: PHYSICAL THERAPIST

## 2017-06-20 PROCEDURE — 80053 COMPREHEN METABOLIC PANEL: CPT

## 2017-06-20 PROCEDURE — 97530 THERAPEUTIC ACTIVITIES: CPT

## 2017-06-20 PROCEDURE — G8979 MOBILITY GOAL STATUS: HCPCS | Mod: CJ | Performed by: PHYSICAL THERAPIST

## 2017-06-20 PROCEDURE — 99900035 HC TECH TIME PER 15 MIN (STAT)

## 2017-06-20 PROCEDURE — 97535 SELF CARE MNGMENT TRAINING: CPT

## 2017-06-20 PROCEDURE — 83735 ASSAY OF MAGNESIUM: CPT

## 2017-06-20 RX ORDER — FAMOTIDINE 20 MG/1
20 TABLET, FILM COATED ORAL 2 TIMES DAILY
Status: DISCONTINUED | OUTPATIENT
Start: 2017-06-20 | End: 2017-06-25 | Stop reason: HOSPADM

## 2017-06-20 RX ORDER — CYANOCOBALAMIN 1000 UG/ML
1000 INJECTION, SOLUTION INTRAMUSCULAR; SUBCUTANEOUS DAILY
Status: DISCONTINUED | OUTPATIENT
Start: 2017-06-20 | End: 2017-06-25 | Stop reason: HOSPADM

## 2017-06-20 RX ORDER — PROGESTERONE 100 MG/1
200 CAPSULE ORAL NIGHTLY
Status: DISCONTINUED | OUTPATIENT
Start: 2017-06-20 | End: 2017-06-25 | Stop reason: HOSPADM

## 2017-06-20 RX ORDER — FAMOTIDINE 40 MG/5ML
20 POWDER, FOR SUSPENSION ORAL 2 TIMES DAILY
Status: DISCONTINUED | OUTPATIENT
Start: 2017-06-20 | End: 2017-06-20

## 2017-06-20 RX ADMIN — PREGABALIN 75 MG: 75 CAPSULE ORAL at 08:06

## 2017-06-20 RX ADMIN — DEXTROSE MONOHYDRATE, SODIUM CHLORIDE, AND POTASSIUM CHLORIDE: 50; 4.5; 1.49 INJECTION, SOLUTION INTRAVENOUS at 03:06

## 2017-06-20 RX ADMIN — CYANOCOBALAMIN 1000 MCG: 1000 INJECTION, SOLUTION INTRAMUSCULAR; SUBCUTANEOUS at 11:06

## 2017-06-20 RX ADMIN — KETOROLAC TROMETHAMINE 10 MG: 30 INJECTION, SOLUTION INTRAMUSCULAR at 06:06

## 2017-06-20 RX ADMIN — PROGESTERONE 200 MG: 100 CAPSULE ORAL at 08:06

## 2017-06-20 RX ADMIN — FAMOTIDINE 20 MG: 40 POWDER, FOR SUSPENSION ORAL at 09:06

## 2017-06-20 RX ADMIN — OCTREOTIDE ACETATE 125 MCG/HR: 1000 INJECTION, SOLUTION INTRAVENOUS; SUBCUTANEOUS at 03:06

## 2017-06-20 RX ADMIN — DEXTROSE MONOHYDRATE, SODIUM CHLORIDE, AND POTASSIUM CHLORIDE: 50; 4.5; 1.49 INJECTION, SOLUTION INTRAVENOUS at 04:06

## 2017-06-20 RX ADMIN — KETOROLAC TROMETHAMINE 10 MG: 30 INJECTION, SOLUTION INTRAMUSCULAR at 11:06

## 2017-06-20 RX ADMIN — SODIUM CHLORIDE 3 G: 9 INJECTION, SOLUTION INTRAVENOUS at 08:06

## 2017-06-20 RX ADMIN — METOCLOPRAMIDE 10 MG: 5 INJECTION, SOLUTION INTRAMUSCULAR; INTRAVENOUS at 11:06

## 2017-06-20 RX ADMIN — FAMOTIDINE 20 MG: 20 TABLET, FILM COATED ORAL at 08:06

## 2017-06-20 RX ADMIN — ACETAMINOPHEN 1000 MG: 10 INJECTION, SOLUTION INTRAVENOUS at 02:06

## 2017-06-20 RX ADMIN — METOCLOPRAMIDE 10 MG: 5 INJECTION, SOLUTION INTRAMUSCULAR; INTRAVENOUS at 06:06

## 2017-06-20 RX ADMIN — IRON SUCROSE 100 MG: 20 INJECTION, SOLUTION INTRAVENOUS at 08:06

## 2017-06-20 RX ADMIN — METOCLOPRAMIDE 10 MG: 5 INJECTION, SOLUTION INTRAMUSCULAR; INTRAVENOUS at 05:06

## 2017-06-20 RX ADMIN — ACETAMINOPHEN 1000 MG: 10 INJECTION, SOLUTION INTRAVENOUS at 05:06

## 2017-06-20 RX ADMIN — SODIUM CHLORIDE 3 G: 9 INJECTION, SOLUTION INTRAVENOUS at 02:06

## 2017-06-20 RX ADMIN — THYROID, PORCINE 90 MG: 30 TABLET ORAL at 08:06

## 2017-06-20 RX ADMIN — PREGABALIN 75 MG: 75 CAPSULE ORAL at 09:06

## 2017-06-20 RX ADMIN — KETOROLAC TROMETHAMINE 10 MG: 30 INJECTION, SOLUTION INTRAMUSCULAR at 05:06

## 2017-06-20 NOTE — OP NOTE
DATE OF PROCEDURE:  06/19/2017    SURGEON:  CIARA Hong M.D.    ASSISTANT:  Mickey.    PREOPERATIVE DIAGNOSIS:  Neuroendocrine tumor metastatic to liver.    POSTOPERATIVE DIAGNOSES:  Neuroendocrine tumor metastatic to liver plus   extensive abdominal adhesions, partial bowel obstruction.    PROCEDURES:  Exploratory laparotomy with resection of liver segment 5, liver   resection segment 8 anterior, liver resection segment 8 posterior,   radiofrequency ablation of liver segment 5, RFA liver segment 6, intralesional   chemotherapy, intraoperative ultrasound, and extensive lysis of adhesions.    DRAINS:  Vamshi right upper quadrant.    ESTIMATED BLOOD LOSS:  200 mL.    SPECIMENS:  Liver metastases.    PROCEDURE IN DETAIL:  With the patient supine on the Operating Room table under   general endotracheal anesthesia, abdomen prepped and draped in usual sterile   manner.  After timeout, invasive monitoring in place under Sandostatin infusion   protocol, the patient was explored through the previous midline.  Upon entering   the abdomen, extensive adhesiolysis was required to gain entrance to the   abdominal cavity and expose the liver.  The liver was densely adhesed to the   anterior abdominal wall, the diaphragm and the retroperitoneum from prior   surgery.  There were also densely matted loops of small bowel.  The old   ileoileal anastomosis was bound down in the pelvis and appeared to be consistent   with a partial small-bowel obstruction with some dilated proximal small bowel.    The adhesions were taken down and the bowel was disentangled and detorsed.  The   pelvic adhesions were lysed.  The bowel was run in its entirety from the   ligament of Treitz to the ileocecal valve, which was still intact.  No   additional tumors were found.  No adenopathy was found.  The attention was then   turned to the liver where after additional mobilization and taking down the   ligamentous attachments and adhesions to  the liver, the liver was fully explored   bimanually and with intraoperative ultrasound.  There were no lesions   discoverable in the left lobe of the liver.  There was a large tumor superiorly   and anteriorly in segment 8, a smaller one in segment 8 posteriorly, which   appeared to be consistent with a previously treated lesion.  There was also an   area in segments 5 and 6, which were questionable.  On ultrasound there was   evidence of previous surgery in this area with a lot of scarring.  The largest   lesion in segment 8 was addressed first where a hepatotomy was performed, the   lesion was excised from the liver using electrocautery and hemostasis was then   secured with TachoSil and Gelfoam soaked in 5-FU and then liver compression   sutures were used to achieve hemostasis.  Attention was then turned to the   posterior lesion in segment 8, which was excised in a similar manner, again   using crushing hemostats and electrocautery and sharp dissection.  Hemostasis   was secured in a similar manner with TachoSil and Gelfoam soaked in 5-FU, both   areas were marked with Hemoclips for future x-ray reference and a suture   hepatorrhaphy was performed using 2-0 Prolene and liver chromic sutures.  There   was an additional small lesion identified anteriorly in segment 5, which the   area was not palpable.  It was a subcentimeter nodule approximately 1 cm deep   based on ultrasound.  This area was excised with ultrasound guidance.  Once this   area was excised, it was then ablated with a 2 cm ablation using the   radiofrequency ablation device.  There was an additional posterior lesion in   segment 6, which was identified on ultrasound and this area was also ablated.    There appeared to be approximately 1 cm lesion and this was ablated with the   radiofrequency device.  No additional gross lesions could be determined in the   liver by palpation or ultrasound.  The abdomen was irrigated copiously with   antibiotic  saline solution and IrriSept and it was then aspirated and irrigated   with saline and aspirated clear.  A Vamshi drain was placed in the right upper   quadrant above the liver and brought out through a separate stab wound incision.    It was sutured in place with 2-0 nylon.  Muscles and fascia were injected with   long-acting anesthetic to assist with postoperative pain control and the   incision was closed with running #1 Prolene suture for the fascia, 4-0 Monocryl   for the skin and then Dermabond was applied.    ESTIMATED BLOOD LOSS:  200 mL.    No transfusions were given.  Postprocedure x-ray confirmed the sponge and needle   counts were correct.  The patient was taken to the Intensive Care Unit in   stable condition.      JOHNNY  dd: 06/19/2017 13:35:22 (CDT)  td: 06/19/2017 20:52:39 (CDT)  Doc ID   #[id number]  Job ID #[id number]    CC:

## 2017-06-20 NOTE — PROGRESS NOTES
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 Doing well. No complaints. Pain well- controlled. No N/V/F/C. No flatus/bm    Review of Systems  Objective:     Vital Signs (Most Recent):  Temp: 98 °F (36.7 °C) (06/20/17 0736)  Pulse: 74 (06/20/17 0700)  Resp: (!) 8 (06/20/17 0730)  BP: (!) 94/54 (06/20/17 0730)  SpO2: 100 % (06/20/17 0730) Vital Signs (24h Range):  Temp:  [97.7 °F (36.5 °C)-99.2 °F (37.3 °C)] 98 °F (36.7 °C)  Pulse:  [65-82] 74  Resp:  [8-32] 8  SpO2:  [99 %-100 %] 100 %  BP: ()/(48-70) 94/54  Arterial Line BP: (109-143)/(44-68) 114/47     Weight: 83.5 kg (184 lb 1.4 oz)  Body mass index is 32.61 kg/m².      Intake/Output Summary (Last 24 hours) at 06/20/17 0814  Last data filed at 06/20/17 0600   Gross per 24 hour   Intake          4905.58 ml   Output             2450 ml   Net          2455.58 ml       Physical Exam   Constitutional: She appears well-developed and well-nourished.   HENT:   Head: Normocephalic.   NGT in place   Eyes: Pupils are equal, round, and reactive to light.   Neck: Normal range of motion.   Cardiovascular: Normal rate, regular rhythm and normal heart sounds.    Pulmonary/Chest: Effort normal.   Abdominal: Soft. Bowel sounds are normal.   Minimal tenderness +BS  Incision c/d/i   Luis Armando drain +ss drainage       Vents:       Lines/Drains/Airways     Central Venous Catheter Line                 Percutaneous Central Line Insertion/Assessment - triple lumen  06/19/17 0840 right internal jugular  less than 1 day          Drain                 Closed/Suction Drain 06/19/17 1210 Right Abdomen Bulb 19 Fr. less than 1 day         NG/OG Tube 06/19/17 0915 nasogastric 18 Fr. Left nostril less than 1 day         Urethral Catheter 06/19/17 0840 Latex 16 Fr. less than 1 day          Arterial Line                 Arterial Line 06/19/17 0840 Right Radial less than 1 day          Peripheral Intravenous Line                 Peripheral IV - Single Lumen 06/19/17 0720 Right Hand 1 day         Peripheral IV - Single Lumen 06/19/17 0725 Right Forearm 1 day         Peripheral IV - Single Lumen 06/19/17 0856 Left Hand less than 1 day                Significant Labs:    CBC/Anemia Profile:    Recent Labs  Lab 06/19/17  1127 06/19/17  1406 06/20/17  0529   WBC  --  7.03 4.93   HGB  --  8.4* 7.3*   HCT 28* 24.0* 21.6*   PLT  --  223 155   MCV  --  93 95   RDW  --  11.6 12.0        Chemistries:    Recent Labs  Lab 06/19/17  1406 06/20/17  0529   * 135*   K 4.2 4.0    105   CO2 21* 27   BUN 7 5*   CREATININE 0.8 0.7   CALCIUM 7.5* 7.4*   ALBUMIN  --  2.7*   PROT  --  4.5*   BILITOT  --  0.6   ALKPHOS  --  62   ALT  --  778*   AST  --  750*   MG  --  2.0   PHOS  --  2.5*         Assessment/Plan:     Active Diagnoses:    Diagnosis Date Noted POA    PRINCIPAL PROBLEM:  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     C: SAT & SBT Coordinated?  N/A                    D: Delirium: CAM-ICU Overall CAM-ICU: Negative   E: Early Mobility Performed? Yes   F: Feeding Goal:    Status:     Current Diet Order   Procedures    Diet NPO Except for: Medication     Order Specific Question:    Except for     Answer:   Medication      AS: Analgesia/Sedation toradol   T: Thromboembolic Prophylaxis Scds   H: HOB > 300 Yes   U: Stress Ulcer Prophylaxis (if needed) pepcid   G: Glucose Control yes   B: Bowel Function     I: Indwelling Catheter (Lines & Zhou) Necessity Zhou and TIMA   D: De-escalation of Antimicrobials/Pharmacotherapies D/c ampicillin    Plan for the day/ETD -sips chips and hard candy  -d/c NGT  -Cont zhou for accurate I/Os  -pt/ot      Code Status:  Family/Goals of Care: No Order       Critical Care Time: 20 min  Critical secondary to Patient has a condition that poses threat to life and bodily function: liver resection      Critical care was time spent personally by me on the following activities: development of treatment plan with patient or surrogate and bedside caregivers, discussions with consultants, evaluation of patient's response to treatment, examination of patient, ordering and performing treatments and interventions, ordering and review of laboratory studies, ordering and review of radiographic studies, pulse oximetry, re-evaluation of patient's condition.  This critical care time did not overlap with that of any other provider or involve time for any procedures.     Bill Dukes Jr, MD  Critical Care Medicine  [facility]

## 2017-06-20 NOTE — PLAN OF CARE
Problem: Physical Therapy Goal  Goal: Physical Therapy Goal  1.  Mod Independent bed mobility  2.  Mod Independent transfers  3.  Ambulate 250' without AD with Mod [place].  Outcome: Ongoing (interventions implemented as appropriate)  Pt would benefit from PT to progress patient to Independent ambulation without assistive devise.

## 2017-06-20 NOTE — PROGRESS NOTES
Pt arrived to unit from ICU via wheelchair. AAOx4. VSS. NAD noted. Pt arrived with O2 @ 2L. IVF, terrance, and PCA running per MD order. Bedrails up x 2. Bed low and locked. Fall precautions maintained. See full documented assessment on pertinent flowsheets. Will continue to monitor.

## 2017-06-20 NOTE — PT/OT/SLP EVAL
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 10/2012    Secondary neuroendocrine tumor of distant lymph nodes 2012      Past Surgical History:   Procedure Laterality Date    breast reduction  and implants Bilateral 2012    EAR MASTOIDECTOMY W/ COCHLEAR IMPLANT W/ LANDMARK Right 2014    SBR, liver r/s, gall bladder  07/2013    Ki 67- 2%    SBR, lymph node r/s  10/2012    found carcinoid with colon obstruction    URETHRAL SLING  2012     General Precautions: Standard, fall  Orthopedic Precautions:    Braces:            Patient History:  Living Environment  Living Environment Comment: Pt lives in [place] alone in [facility], no KAIT.  Has WIS and tub/shower.  Indep all needs. Has borrowed rollator and cane if needed.    Prior level of function:            Dominant hand: right    Subjective:  Communicated with nurse prior to session.  Pt agreeable  Chief Complaint: abdominal discomfort  Patient/Family stated goals: return to PLOF    Pain/Comfort  Pain Rating 1: 2/10  Location - Orientation 1: generalized  Location 1: abdomen  Pain Addressed 1: Reposition, Distraction, Pre-medicate for activity  Pain Rating Post-Intervention 1: 3/10    Objective:       Cognitive Exam:  Oriented to: AO4  Follows Commands/attention: Follows multistep  commands  Communication: clear/fluent  Memory:  No Deficits noted  Safety awareness/insight to disability: intact  Coping skills/emotional  control: Appropriate to situation    Visual/perceptual:  Intact    Physical Exam:  Postural examination/scapula alignment: Rounded shoulder, Head forward and Posterior pelvic tilt  Skin integrity: bruising, abdominal incision  Edema: None noted     Sensation:   Intact    Upper Extremity Range of Motion:  Right Upper Extremity: WFL  Left Upper Extremity: WFL    Upper Extremity Strength:  Right Upper Extremity: WFL  Left Upper Extremity: WFL   Strength: WFL    Fine motor coordination:   Intact    Gross motor coordination: WFL    Functional Mobility:  Bed Mobility:  Rolling/Turning Right: Contact guard assistance  Scooting/Bridging: Stand by Assistance  Supine to Sit: Contact Guard Assistance    Transfers:  Sit <> Stand Assistance: Contact Guard Assistance  Sit <> Stand Assistive Device: No Assistive Device  Bed <> Chair Technique: Stand Pivot  Bed <> Chair Transfer Assistance: Contact Guard Assistance, Minimum Assistance  Bed <> Chair Assistive Device: No Assistive Device    Functional Ambulation: Min A pt pushing IV pole ~20' to BS chair    Activities of Daily Living:     Feeding adaptive equipment:   UE Dressing Level of Assistance: Set-up Assistance  UE adaptive equipment:      LE adaptive equipment:   Grooming Position: Seated  Grooming Level of Assistance: Stand by assistance              Bathing adaptive equipment:     Balance:   Static Sit: GOOD: Takes MODERATE challenges from all directions  Dynamic Sit: GOOD: Maintains balance through MODERATE excursions of active trunk movement  Static Stand: FAIR: Maintains without assist but unable to take challenges  Dynamic stand: POOR: N/A    Therapeutic Activities and Exercises:  Pt educated on and performed BUE & trunk  AROM and stretching    AM-PAC 6 CLICK ADL  How much help from another person does this patient currently need?  1 = Unable, Total/Dependent Assistance  2 = A lot, Maximum/Moderate Assistance  3 = A little, Minimum/Contact Guard/Supervision  4 =  "None, Modified [place]/Independent    Putting on and taking off regular lower body clothing? : 3  Bathing (including washing, rinsing, drying)?: 3  Toileting, which includes using toilet, bedpan, or urinal? : 3  Putting on and taking off regular upper body clothing?: 3  Taking care of personal grooming such as brushing teeth?: 3  Eating meals?: 3  Total Score: 18    AM-PAC Raw Score CMS "G-Code Modifier Level of Impairment Assistance   6 % Total / Unable   7 - 9 CM 80 - 100% Maximal Assist   10-14 CL 60 - 80% Moderate Assist   15 - 19 CK 40 - 60% Moderate Assist   20 - 22 CJ 20 - 40% Minimal Assist   23 CI 1-20% SBA / CGA   24 CH 0% Independent/ Mod I       Patient left up in chair with all lines intact, call button in reach and nurse notified    Assessment:  Krysten Muhammad is a 57 y.o. 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 Pt will benefit from skilled OT services..    Rehab identified problem list/impairments: Rehab identified problem list/impairments: weakness, gait instability, impaired endurance, impaired balance, impaired self care skills, impaired functional mobilty, pain    Rehab potential is excellent.    Activity tolerance: Good    Discharge recommendations: Discharge Facility/Level Of Care Needs: home     Barriers to discharge: Barriers to Discharge: None    Equipment recommendations: other (see comments) (no anticipated needs)     GOALS:    Occupational Therapy Goals        Problem: Occupational Therapy Goal    Goal Priority Disciplines Outcome Interventions   Occupational Therapy Goal     OT, PT/OT Ongoing (interventions implemented as appropriate)    Description:  Goals to be met by: 7/20     Patient will increase functional independence with ADLs by performing:    UE Dressing with Modified [place].  LE Dressing with Modified [place].  Grooming while " standing with Modified [place].  Toileting from toilet with Modified [place] for hygiene and clothing management.   Toilet transfer to toilet with Modified [place].  Indep HEP                      PLAN:  Patient to be seen 5 x/week to address the above listed problems via self-care/home management, therapeutic activities, therapeutic exercises  Plan of Care expires: 07/20/17  Plan of Care reviewed with: patient         Richardson Davenport OT  06/20/2017

## 2017-06-20 NOTE — PLAN OF CARE
TN met with patient and daughter. Patient's daughter provided TN with advance directive copy to be placed in chart. TN printed out copy and placed copy in patient's chart. TN also notified patient's nurse Servemilia RN.      06/20/17 1224   Discharge Reassessment   Assessment Type Discharge Planning Reassessment     Noris Marie RN  Transition Navigator  [phone]

## 2017-06-20 NOTE — PT/OT/SLP EVAL
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 Diagnoses of Malignant carcinoid tumor of the ileum and Intestinal adhesions were also pertinent to this visit.      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 Hong  Date referred to PT: 6/20/2017      General Precautions: Standard, fall  Orthopedic Precautions:     Braces:              Patient History:  Lives With: alone  Equipment Currently Used at Home: none  DME owned (not currently used): none    Previous Level of Function:  Ambulation Skills: independent  Transfer Skills: independent  ADL Skills: independent  Work/Leisure Activity: independent    Subjective:  Communicated with nurse prior to session.  Chief Complaint: belly pain  Patient goals: walk upright again    Pain/Comfort  Pain Rating 1: 2/10  Location 1: abdomen  Pain Addressed 1: Distraction  Pain Rating Post-Intervention 1: 2/10      Objective:         Cognitive Exam:  Oriented to: Person, Place, Time and Situation    Follows Commands/attention: Follows  two-step commands  Communication: clear/fluent  Safety awareness/insight to disability: intact    Physical Exam:  Postural examination/scapula alignment: Posterior pelvic tilt    Skin integrity: Visible skin intact  Edema: None noted     Sensation:   Intact    Upper Extremity Range of Motion:  Right Upper Extremity: WFL  Left Upper Extremity: WFL    Upper Extremity Strength:  Right Upper Extremity: WFL  Left Upper Extremity: WFL    Lower Extremity Range of Motion:  Right Lower Extremity: WFL  Left Lower Extremity: WFL    Lower Extremity Strength:  Right Lower Extremity: WFL  Left Lower Extremity: WFL     Fine motor coordination:  Intact    Gross motor coordination: WFL    Functional Mobility:  Bed Mobility:  Rolling/Turning to Left: Contact guard assistance  Rolling/Turning Right: Contact guard assistance  Sit to Supine: Contact Guard Assistance    Transfers:  Sit <> Stand Assistance: Contact Guard Assistance  Sit <> Stand Assistive Device: No Assistive Device  Bed <> Chair Technique: Stand Pivot    Gait:   Gait Distance: Pt ambulated 70' with RW with CG and assist for IV pole with vc for more upright posture.    Stairs:  NT    Balance:   Static Sit: GOOD: Takes MODERATE challenges from all directions  Dynamic Sit: GOOD-: Maintains balance through MODERATE excursions of active trunk movement,     Static Stand: FAIR+: Takes MINIMAL challenges from all directions  Dynamic stand: FAIR: Needs CONTACT GUARD during gait      AM-PAC 6 CLICK MOBILITY  How much help from another person does this patient currently need?   1 = Unable, Total/Dependent Assistance  2 = A lot, Maximum/Moderate Assistance  3 = A little, Minimum/Contact Guard/Supervision  4 = None, Modified [place]/Independent    Turning over in bed (including adjusting bedclothes, sheets and blankets)?: 3  Sitting down on and standing up from a chair with arms (e.g., wheelchair, bedside commode, etc.): 3  Moving from lying on back to sitting on the side of the  bed?: 3  Moving to and from a bed to a chair (including a wheelchair)?: 3  Need to walk in hospital room?: 3  Climbing 3-5 steps with a railing?: 3  Total Score: 18     AM-PAC Raw Score CMS G-Code Modifier Level of Impairment Assistance   6 % Total / Unable   7 - 9 CM 80 - 100% Maximal Assist   10 - 14 CL 60 - 80% Moderate Assist   15 - 19 CK 40 - 60% Moderate Assist   20 - 22 CJ 20 - 40% Minimal Assist   23 CI 1-20% SBA / CGA   24 CH 0% Independent/ Mod I     Patient left HOB elevated with nurse notified, all lines intact, call bell in reach.    Assessment:   Krysten Muhammad is a 57 y.o. 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    Rehab identified problem list/impairments: Rehab identified problem list/impairments: weakness, impaired endurance, decreased lower extremity function, pain, impaired functional mobilty    Rehab potential is good.    Activity tolerance: Good    Discharge recommendations: Discharge Facility/Level Of Care Needs: home with home health     Barriers to discharge:      Equipment recommendations: Equipment Needed After Discharge:  (TBD)     GOALS:    Physical Therapy Goals        Problem: Physical Therapy Goal    Goal Priority Disciplines Outcome Goal Variances Interventions   Physical Therapy Goal     PT/OT, PT Ongoing (interventions implemented as appropriate)     Description:  1.  Mod Independent bed mobility  2.  Mod Independent transfers  3.  Ambulate 250' without AD with Mod [place].                    PLAN:    Patient to be seen 6 x/week to address the above listed problems via gait training, therapeutic activities, therapeutic exercises  Plan of Care expires: 07/20/17  Plan of Care reviewed with: patient    Functional Assessment Tool Used: Ampac  Score: 18  Functional Limitation: Mobility: Walking and moving around  Mobility: Walking and Moving Around Current Status (): CK  Mobility:  Walking and Moving Around Goal Status (): ODETTE Mejia, PT  06/20/2017

## 2017-06-20 NOTE — PLAN OF CARE
Problem: Occupational Therapy Goal  Goal: Occupational Therapy Goal  Goals to be met by: 7/20     Patient will increase functional independence with ADLs by performing:    UE Dressing with Modified [place].  LE Dressing with Modified [place].  Grooming while standing with Modified [place].  Toileting from toilet with Modified [place] for hygiene and clothing management.   Toilet transfer to toilet with Modified [place].  Indep HEP    Outcome: Ongoing (interventions implemented as appropriate)  OT eval performed, report to follow    No anticipated discharge needs

## 2017-06-20 NOTE — PLAN OF CARE
Problem: Patient Care Overview  Goal: Interdisciplinary Rounds/Family Conf  Skin Intact, Prophylactic Meplex in place to buttocks CDI.

## 2017-06-21 LAB
ALBUMIN SERPL BCP-MCNC: 2.5 G/DL
ALP SERPL-CCNC: 76 U/L
ALT SERPL W/O P-5'-P-CCNC: 629 U/L
ANION GAP SERPL CALC-SCNC: 1 MMOL/L
AST SERPL-CCNC: 423 U/L
BASOPHILS # BLD AUTO: 0.01 K/UL
BASOPHILS NFR BLD: 0.2 %
BILIRUB SERPL-MCNC: 0.4 MG/DL
BUN SERPL-MCNC: 3 MG/DL
CALCIUM SERPL-MCNC: 7.8 MG/DL
CHLORIDE SERPL-SCNC: 105 MMOL/L
CO2 SERPL-SCNC: 30 MMOL/L
CREAT SERPL-MCNC: 0.7 MG/DL
DIFFERENTIAL METHOD: ABNORMAL
EOSINOPHIL # BLD AUTO: 0.1 K/UL
EOSINOPHIL NFR BLD: 0.8 %
ERYTHROCYTE [DISTWIDTH] IN BLOOD BY AUTOMATED COUNT: 12.2 %
EST. GFR  (AFRICAN AMERICAN): >60 ML/MIN/1.73 M^2
EST. GFR  (NON AFRICAN AMERICAN): >60 ML/MIN/1.73 M^2
GLUCOSE SERPL-MCNC: 114 MG/DL
HCT VFR BLD AUTO: 18.3 %
HCT VFR BLD AUTO: 19 %
HGB BLD-MCNC: 6.2 G/DL
HGB BLD-MCNC: 6.2 G/DL
LYMPHOCYTES # BLD AUTO: 0.8 K/UL
LYMPHOCYTES NFR BLD: 12.5 %
MAGNESIUM SERPL-MCNC: 2 MG/DL
MCH RBC QN AUTO: 31.5 PG
MCHC RBC AUTO-ENTMCNC: 32.6 %
MCV RBC AUTO: 96 FL
MONOCYTES # BLD AUTO: 0.6 K/UL
MONOCYTES NFR BLD: 10.4 %
NEUTROPHILS # BLD AUTO: 4.7 K/UL
NEUTROPHILS NFR BLD: 75.9 %
PHOSPHATE SERPL-MCNC: 2 MG/DL
PLATELET # BLD AUTO: 149 K/UL
PMV BLD AUTO: 10.1 FL
POTASSIUM SERPL-SCNC: 4 MMOL/L
PROT SERPL-MCNC: 4.6 G/DL
RBC # BLD AUTO: 1.97 M/UL
SODIUM SERPL-SCNC: 136 MMOL/L
WBC # BLD AUTO: 6.14 K/UL

## 2017-06-21 PROCEDURE — 36415 COLL VENOUS BLD VENIPUNCTURE: CPT

## 2017-06-21 PROCEDURE — 84100 ASSAY OF PHOSPHORUS: CPT

## 2017-06-21 PROCEDURE — 97530 THERAPEUTIC ACTIVITIES: CPT

## 2017-06-21 PROCEDURE — 94761 N-INVAS EAR/PLS OXIMETRY MLT: CPT

## 2017-06-21 PROCEDURE — 80053 COMPREHEN METABOLIC PANEL: CPT

## 2017-06-21 PROCEDURE — 97116 GAIT TRAINING THERAPY: CPT

## 2017-06-21 PROCEDURE — 83735 ASSAY OF MAGNESIUM: CPT

## 2017-06-21 PROCEDURE — 97535 SELF CARE MNGMENT TRAINING: CPT

## 2017-06-21 PROCEDURE — 25000003 PHARM REV CODE 250: Performed by: SURGERY

## 2017-06-21 PROCEDURE — 63600175 PHARM REV CODE 636 W HCPCS: Performed by: SURGERY

## 2017-06-21 PROCEDURE — 85014 HEMATOCRIT: CPT

## 2017-06-21 PROCEDURE — 85025 COMPLETE CBC W/AUTO DIFF WBC: CPT

## 2017-06-21 PROCEDURE — 94799 UNLISTED PULMONARY SVC/PX: CPT

## 2017-06-21 PROCEDURE — 11000001 HC ACUTE MED/SURG PRIVATE ROOM

## 2017-06-21 PROCEDURE — 27000221 HC OXYGEN, UP TO 24 HOURS

## 2017-06-21 PROCEDURE — 85018 HEMOGLOBIN: CPT

## 2017-06-21 RX ORDER — TRAMADOL HYDROCHLORIDE 50 MG/1
50 TABLET ORAL EVERY 6 HOURS PRN
Status: DISCONTINUED | OUTPATIENT
Start: 2017-06-21 | End: 2017-06-23

## 2017-06-21 RX ORDER — HYDROCODONE BITARTRATE AND ACETAMINOPHEN 500; 5 MG/1; MG/1
TABLET ORAL
Status: DISCONTINUED | OUTPATIENT
Start: 2017-06-21 | End: 2017-06-22

## 2017-06-21 RX ADMIN — KETOROLAC TROMETHAMINE 10 MG: 30 INJECTION, SOLUTION INTRAMUSCULAR at 05:06

## 2017-06-21 RX ADMIN — FAMOTIDINE 20 MG: 20 TABLET, FILM COATED ORAL at 09:06

## 2017-06-21 RX ADMIN — METOCLOPRAMIDE 10 MG: 5 INJECTION, SOLUTION INTRAMUSCULAR; INTRAVENOUS at 12:06

## 2017-06-21 RX ADMIN — FAMOTIDINE 20 MG: 20 TABLET, FILM COATED ORAL at 08:06

## 2017-06-21 RX ADMIN — METOCLOPRAMIDE 10 MG: 5 INJECTION, SOLUTION INTRAMUSCULAR; INTRAVENOUS at 11:06

## 2017-06-21 RX ADMIN — PREGABALIN 75 MG: 75 CAPSULE ORAL at 08:06

## 2017-06-21 RX ADMIN — THYROID, PORCINE 90 MG: 30 TABLET ORAL at 10:06

## 2017-06-21 RX ADMIN — IRON SUCROSE 100 MG: 20 INJECTION, SOLUTION INTRAVENOUS at 10:06

## 2017-06-21 RX ADMIN — ERYTHROPOIETIN 10000 UNITS: 10000 INJECTION, SOLUTION INTRAVENOUS; SUBCUTANEOUS at 10:06

## 2017-06-21 RX ADMIN — TRAMADOL HYDROCHLORIDE 50 MG: 50 TABLET, COATED ORAL at 07:06

## 2017-06-21 RX ADMIN — METOCLOPRAMIDE 10 MG: 5 INJECTION, SOLUTION INTRAMUSCULAR; INTRAVENOUS at 05:06

## 2017-06-21 RX ADMIN — PROGESTERONE 200 MG: 100 CAPSULE ORAL at 08:06

## 2017-06-21 RX ADMIN — METOCLOPRAMIDE 10 MG: 5 INJECTION, SOLUTION INTRAMUSCULAR; INTRAVENOUS at 06:06

## 2017-06-21 RX ADMIN — CYANOCOBALAMIN 1000 MCG: 1000 INJECTION, SOLUTION INTRAMUSCULAR; SUBCUTANEOUS at 09:06

## 2017-06-21 RX ADMIN — PREGABALIN 75 MG: 75 CAPSULE ORAL at 09:06

## 2017-06-21 RX ADMIN — DEXTROSE MONOHYDRATE, SODIUM CHLORIDE, AND POTASSIUM CHLORIDE: 50; 4.5; 1.49 INJECTION, SOLUTION INTRAVENOUS at 06:06

## 2017-06-21 NOTE — PLAN OF CARE
Problem: Patient Care Overview  Goal: Plan of Care Review  Outcome: Ongoing (interventions implemented as appropriate)  Received pt on 2L NC; no distress noted. Pt on PCA; will cont to monitor.

## 2017-06-21 NOTE — PROGRESS NOTES
.Pharmacy New Medication Education    Patient accepted medication education.    Pharmacy educated patient on the following medications, using the teach-back method.   Albuterol  Vit b12  Benadryl  Procrit  Pepcid  Hydralazine  Dilaudid  Venofer  Toradol  Labetalol  Reglan  Narcan  Octreotide  Zofran  Lyrica  Progesterone  Phenergan  Thyroid    Learners of pharmacy medication education included:  Patient and daughter    Patient +/- learner response:  verbalize understanding

## 2017-06-21 NOTE — PLAN OF CARE
Problem: Patient Care Overview  Goal: Plan of Care Review  Outcome: Ongoing (interventions implemented as appropriate)  Pt in NAD. AAOX3. Scheduled meds given per MAR. R subclavian TLC infusing D5 1/2 NS with 20 mEq of K at 75 and sandostatin at 2.5 mL hr per MD order dressing CDI. PCA pump infusing to PIV hand dressing CDI. CO2 monitor in place. Midline incision open to air edges approximated. TIMA drain to RUQ draining sanguinous fluid. Murillo in place secured to top of thigh draining clear yellow urine. Pt receiving 2L O2 via NC. NPO status maintained. Pt denies pain and nausea. Encouraged to call for assistance verbalized understanding. Safety maintained bed in low locked position. SR up X2, bed alarm on, and call bell in reach. Will continue to monitor.

## 2017-06-21 NOTE — PLAN OF CARE
TN met with pt and daughter Doris [phone]   family is staying at [facility] - flew to [place] from [place], [place]      Per TN eval:   Patient is independent, no HH or medical equipment at home. Patient lives by herself. Patient's daughter states her and her brother are staying in [place] while her mother is in the hospital.     6/19 -- s/p ex lap with liver rsxn -- NET mets to liver      06/21/17 6291   Discharge Reassessment   Assessment Type Discharge Planning Reassessment   Can the patient answer the patient profile reliably? Yes, cognitively intact   Describe the patient's ability to walk at the present time. Minor restrictions or changes   How often would a person be available to care for the patient? Whenever needed   Number of comorbid conditions (as recorded on the chart) One   Discharge plan remains the same: Yes   Provided patient/caregiver education on the expected discharge date and the discharge plan Yes   Discharge Plan A Home;Home with family   Discharge Plan B Home;Home with family;Home Health   Change in patient condition or support system No   Patient choice form signed by patient/caregiver No   Explained to the the patient/caregiver why the discharge planned changed: (n/a)   Involved the patient/caregiver in establishing a new discharge plan: (n/a )

## 2017-06-21 NOTE — PROGRESS NOTES
Progress Note    Admit Date: 6/19/2017   LOS: 2 days     SUBJECTIVE:     Pt complains of feeling achy overnight but otherwise no issues.    Scheduled Meds:   cyanocobalamin  1,000 mcg Subcutaneous Daily    epoetin bertha (PROCRIT) injection  10,000 Units Subcutaneous Every Mon, Wed, Fri    famotidine  20 mg Oral BID    iron sucrose (VENOFER) IVPB  100 mg Intravenous Daily    ketorolac  10 mg Intravenous Q6H    metoclopramide HCl  10 mg Intravenous Q6H    pregabalin  75 mg Oral BID    progesterone  200 mg Oral QHS    thyroid  90 mg Oral Daily     Continuous Infusions:   dextrose 5 % and 0.45 % NaCl with KCl 20 mEq 75 mL/hr at 06/21/17 0630    hydromorphone in 0.9 % NaCl 6 mg/30 ml      octreotide infusion (50 mcg/mL) 125 mcg/hr (06/20/17 0358)     PRN Meds:albuterol sulfate, diphenhydrAMINE, hydrALAZINE, labetalol, naloxone, ondansetron, promethazine (PHENERGAN) IVPB    Review of patient's allergies indicates:   Allergen Reactions    Ciprofloxacin Other (See Comments)     Nausea and joint pain      Epinephrine Other (See Comments)     Carcinoid patient    Levaquin [levofloxacin] Other (See Comments)     Nausea and joint pain    Morphine Nausea And Vomiting    Ciprofloxacin hcl     Demerol [meperidine]     Hydromorphone Nausea Only    Quinolones     Dilaudid [hydromorphone (bulk)] Nausea Only       Review of Systems  Denies headache, fever, chills, cp, sob, n/v, diarrhea, dysuria, and lower ext swelling      OBJECTIVE:     Vital Signs (Most Recent)  Temp: (!) 100.6 °F (38.1 °C) (06/21/17 0335)  Pulse: 88 (06/21/17 0335)  Resp: 16 (06/21/17 0335)  BP: (!) 109/59 (06/21/17 0335)  SpO2: 99 % (06/21/17 0448)    Vital Signs Range (Last 24H):  Temp:  [97 °F (36.1 °C)-100.6 °F (38.1 °C)]   Pulse:  [71-88]   Resp:  [7-25]   BP: ()/(45-59)   SpO2:  [92 %-100 %]   Arterial Line BP: (111-134)/(42-64)     I & O (Last 24H):  Intake/Output Summary (Last 24 hours) at 06/21/17 9947  Last data filed at  06/21/17 0630   Gross per 24 hour   Intake                0 ml   Output             2865 ml   Net            -2865 ml     Physical Exam:  .[facility]      Laboratory:  CBC:   Recent Labs  Lab 06/20/17  0529   WBC 4.93   RBC 2.28*   HGB 7.3*   HCT 21.6*      MCV 95   MCH 32.0*   MCHC 33.8     CMP:   Recent Labs  Lab 06/20/17  0529   *   CALCIUM 7.4*   ALBUMIN 2.7*   PROT 4.5*   *   K 4.0   CO2 27      BUN 5*   CREATININE 0.7   ALKPHOS 62   *   *   BILITOT 0.6           ASSESSMENT/PLAN:     Pt with malignant carcinoid of the liver s/p liver resection    -remove zhou today  - PT/OT  - monitor for BM    Demian Ta MD  PGY-2 FM  7:28 AM

## 2017-06-21 NOTE — PLAN OF CARE
Problem: Patient Care Overview  Goal: Plan of Care Review  Patient received on    2Lpm NC with SpO2    99%. Pt with no apparent distress noted. Will continue to monitor.

## 2017-06-21 NOTE — PHYSICIAN QUERY
"PT Name: Krysten Muhammad  MR #: [id number]    Physician Query Form - Hematology Clarification      CDS/: Elaine Ramsey               Contact information: [phone]    This form is a permanent document in the medical record.      Query Date: June 21, 2017    By submitting this query, we are merely seeking further clarification of documentation. Please utilize your independent clinical judgment when addressing the question(s) below.    The Medical record contains the following:   Indicators  Supporting Clinical Findings Location in Medical Record   X "Anemia" documented Asymptomatic anemia PN 6/21   X H & H = H= 8.4 - 6.2  H=24.0 - 19.0 LAB 6/19 - 6/21   X BP =                     HR= BP= 92/51, 89/51, 95/45 VS 6/20    "GI bleeding" documented      Acute bleeding (Non GI site)      Transfusion(s)      Treatment:     X Other:   Estimated blood loss: 200 ml  OP note 6/19     Provider, please specify diagnosis or diagnoses associated with above clinical findings.    [x  ] Acute blood loss anemia expected post-operatively  [  ] Acute blood loss anemia  [  ] Iron deficiency anemia  [  ] Chronic blood loss anemia  [  ] Pernicious anemia    [  ] Anemia of chronic disease ( Specify chronic disease)      [  ] Neoplastic disease      [  ] Due to Chemotherapy      [  ] Other (Specify) _______________________________     [  ] Clinically Undetermined     [  ] Other Hematological Diagnosis (please specify): _________________________________    [  ] Clinically Undetermined       Please document in your progress notes daily for the duration of treatment, until resolved, and include in your discharge summary.                                                                                                      "

## 2017-06-21 NOTE — PLAN OF CARE
Problem: Occupational Therapy Goal  Goal: Occupational Therapy Goal  Goals to be met by: 7/20     Patient will increase functional independence with ADLs by performing:    UE Dressing with Modified [place].  LE Dressing with Modified [place].  Grooming while standing with Modified [place].  Toileting from toilet with Modified [place] for hygiene and clothing management.   Toilet transfer to toilet with Modified [place].  Indep HEP     Outcome: Ongoing (interventions implemented as appropriate)  Progressing toward goals.  Pt continues to be limited by decreased endurance, strength, increased pain.    Cont POC

## 2017-06-21 NOTE — PT/OT/SLP PROGRESS
"Occupational Therapy  Treatment    Krysten Muhammad   MRN: [id number]   Admitting Diagnosis: Metastatic malignant carcinoid tumor to liver    OT Date of Treatment: 06/21/17   OT Start Time: 1124  OT Stop Time: 1204  OT Total Time (min): 40 min    Billable Minutes:  Self Care/Home Management 15 and Therapeutic Activity 25    General Precautions: Standard, fall  Orthopedic Precautions:    Braces:           Subjective:  Communicated with nurse prior to session.  Pt groggy sttes "I can't keep my eyes open"  Pain/Comfort  Pain Rating 1: 3/10  Location - Orientation 1: generalized  Location 1: abdomen  Pain Addressed 1: Reposition, Distraction  Pain Rating Post-Intervention 1: 3/10    Objective:        Functional Mobility:  Bed Mobility:  Rolling/Turning Right: Contact guard assistance  Scooting/Bridging: Stand by Assistance  Supine to Sit: Contact Guard Assistance  Sit to Supine: Contact Guard Assistance    Transfers:   Sit <> Stand Assistance: Contact Guard Assistance, Stand By Assistance  Sit <> Stand Assistive Device: Rolling Walker  Toilet Transfer Technique: Stand Pivot  Toilet Transfer Assistance: Stand By Assistance, Contact Guard Assistance  Toilet Transfer Assistive Device: No Assistive Device    Functional Ambulation: CGA w/RW ~150'    Activities of Daily Living:     Feeding adaptive equipment:   UE Dressing Level of Assistance: Set-up Assistance  UE adaptive equipment:      LE adaptive equipment:         Toileting Where Assessed: Toilet  Toileting Level of Assistance: Stand by assistance        Bathing adaptive equipment:     Balance:   Static Sit: GOOD: Takes MODERATE challenges from all directions  Dynamic Sit: GOOD: Maintains balance through MODERATE excursions of active trunk movement  Static Stand: FAIR+: Takes MINIMAL challenges from all directions  Dynamic stand: FAIR: Needs CONTACT GUARD during gait    Therapeutic Activities and Exercises:  Pt performed BUE AROM/stretch while seated EOB.  Pt educated on & " "performed isometric abdominal ex, pelvic tilts.  Gait & ADL as above    AM-PAC 6 CLICK ADL   How much help from another person does this patient currently need?   1 = Unable, Total/Dependent Assistance  2 = A lot, Maximum/Moderate Assistance  3 = A little, Minimum/Contact Guard/Supervision  4 = None, Modified [place]/Independent    Putting on and taking off regular lower body clothing? : 3  Bathing (including washing, rinsing, drying)?: 3  Toileting, which includes using toilet, bedpan, or urinal? : 3  Putting on and taking off regular upper body clothing?: 3  Taking care of personal grooming such as brushing teeth?: 3  Eating meals?: 3  Total Score: 18     AM-PAC Raw Score CMS "G-Code Modifier Level of Impairment Assistance   6 % Total / Unable   7 - 8 CM 80 - 100% Maximal Assist   9-13 CL 60 - 80% Moderate Assist   14 - 19 CK 40 - 60% Moderate Assist   20 - 22 CJ 20 - 40% Minimal Assist   23 CI 1-20% SBA / CGA   24 CH 0% Independent/ Mod I       Patient left HOB elevated with all lines intact, call button in reach, nurse notified and daughter present    ASSESSMENT:  Krysten Muhammad is a 57 y.o. female with a medical diagnosis of Metastatic malignant carcinoid tumor to liver and presents with increased pain and fatigue, decreased strength, balance, endurance limiting indep all needs. Pt continues to benefit from skilled OT services.    Rehab identified problem list/impairments: Rehab identified problem list/impairments: weakness, impaired endurance, impaired functional mobilty, impaired self care skills, gait instability, impaired balance, decreased lower extremity function, pain    Rehab potential is excellent.    Activity tolerance: Fair    Discharge recommendations: Discharge Facility/Level Of Care Needs: home     Barriers to discharge: Barriers to Discharge: None    Equipment recommendations: none     GOALS:    Occupational Therapy Goals        Problem: Occupational Therapy Goal    Goal Priority " Disciplines Outcome Interventions   Occupational Therapy Goal     OT, PT/OT Ongoing (interventions implemented as appropriate)    Description:  Goals to be met by: 7/20     Patient will increase functional independence with ADLs by performing:    UE Dressing with Modified [place].  LE Dressing with Modified [place].  Grooming while standing with Modified [place].  Toileting from toilet with Modified [place] for hygiene and clothing management.   Toilet transfer to toilet with Modified [place].  Indep HEP                      Plan:  Patient to be seen 5 x/week to address the above listed problems via self-care/home management, therapeutic activities, therapeutic exercises  Plan of Care expires: 07/20/17  Plan of Care reviewed with: patient, daughter         Richardson URIBE Ethel, OT  06/21/2017

## 2017-06-21 NOTE — PT/OT/SLP PROGRESS
Physical Therapy  Treatment    Krysten Muhammad   MRN: [id number]   Admitting Diagnosis: Metastatic malignant carcinoid tumor to liver    PT Received On: 06/21/17  PT Start Time: 1605     PT Stop Time: 1640    PT Total Time (min): 35 min       Billable Minutes:  Gait [id number] and Therapeutic Activity 15    Treatment Type: Treatment  PT/PTA: PT     PTA Visit Number: 0       General Precautions: Standard, fall  Orthopedic Precautions: N/A   Braces: N/A         Subjective:  Communicated with RN prior to session.  Daughter reports earlier in day, pt was making odd statements, at one point drawing with her finger in the air. RT in room at start of PT session indicating that O2 was not flowing and that pt was satting at 83%, however o2 replaced at 3L and pt o2 sats up to 98%.  Pt wished to use the bathroom prior to gait session. Reported discomfort with central line dressing and is eager to eat, states she is currently only allowed ice chips, water and candy.    Pain/Comfort  Pain Rating 1: 1/10  Location - Orientation 1: generalized  Location 1: abdomen  Pain Addressed 1: Reposition, Distraction, Cessation of Activity (pt on PCA)  Pain Rating Post-Intervention 1: 2/10    Objective:   Patient found with: TIMA drain, central line, oxygen    Functional Mobility:  Bed Mobility:   Rolling/Turning Right: Contact guard assistance  Scooting/Bridging: Stand by Assistance  Supine to Sit: Contact Guard Assistance  Sit to Supine: Contact Guard Assistance    Transfers:  Sit <> Stand Assistance: Stand By Assistance  Sit <> Stand Assistive Device: Rolling Walker    Gait:   Gait Distance: pt ambulated 240 feet with RW, CGA, assive for IV, o2 at 2L/min via NC.  Unable to fully stand upright due to pulling of central line dressing on neck  Assistance 1: Contact Guard Assistance  Gait Assistive Device: Rolling walker  Gait Pattern: reciprocal  Gait Deviation(s): forward lean        Balance:   Static Sit: GOOD-: Takes MODERATE challenges from all  directions but inconsistently  Dynamic Sit: GOOD-: Maintains balance through MODERATE excursions of active trunk movement,     Static Stand: FAIR+: Takes MINIMAL challenges from all directions  Dynamic stand: FAIR+: Needs CLOSE SUPERVISION during gait and is able to right self with minor LOB     Therapeutic Activities and Exercises:  Patient educated in use of log roll technique, able to perform with CGA.  Pt ambulated to , able to sit/stand from commode with SBA. Independent with hygiene tasks and washing hands. Pt ambulated in hallway with RW and o2 at 2L/min. Returned to room, pt provided with written handout of LE TE to perform throughout day - daughter present for education.  Pt instructed in bridging technique but when performed, pt complained of headache to L side of head, instructed pt to avoid that particular exercise.  Pt left in daughters' supervision with all needs in reach, o2 on 3L     AM-PAC 6 CLICK MOBILITY  How much help from another person does this patient currently need?   1 = Unable, Total/Dependent Assistance  2 = A lot, Maximum/Moderate Assistance  3 = A little, Minimum/Contact Guard/Supervision  4 = None, Modified [place]/Independent    Turning over in bed (including adjusting bedclothes, sheets and blankets)?: 3  Sitting down on and standing up from a chair with arms (e.g., wheelchair, bedside commode, etc.): 3  Moving from lying on back to sitting on the side of the bed?: 3  Moving to and from a bed to a chair (including a wheelchair)?: 3  Need to walk in hospital room?: 3  Climbing 3-5 steps with a railing?: 3  Total Score: 18    AM-PAC Raw Score CMS G-Code Modifier Level of Impairment Assistance   6 % Total / Unable   7 - 9 CM 80 - 100% Maximal Assist   10 - 14 CL 60 - 80% Moderate Assist   15 - 19 CK 40 - 60% Moderate Assist   20 - 22 CJ 20 - 40% Minimal Assist   23 CI 1-20% SBA / CGA   24 CH 0% Independent/ Mod I     Patient left supine with all lines intact, call button  in reach, RN notified and daughter present.    Assessment:  Krysten Muhammad is a 57 y.o. female with a medical diagnosis of Metastatic malignant carcinoid tumor to liver . Patient making good progress, ambulated around nursing station with use of RW, wearing o2 via NC this afternoon 2/2 to earlier situation when o2 sats dropped to 83% while at rest.  Pt motivating and with good participation with therapy.     Rehab identified problem list/impairments: Rehab identified problem list/impairments: weakness, impaired functional mobilty, gait instability, impaired balance, impaired endurance, pain    Rehab potential is excellent.    Activity tolerance: Good    Discharge recommendations: Discharge Facility/Level Of Care Needs: home     Barriers to discharge: Barriers to Discharge: None    Equipment recommendations: Equipment Needed After Discharge: none     GOALS:    Physical Therapy Goals        Problem: Physical Therapy Goal    Goal Priority Disciplines Outcome Goal Variances Interventions   Physical Therapy Goal     PT/OT, PT Ongoing (interventions implemented as appropriate)     Description:  1.  Mod Independent bed mobility  2.  Mod Independent transfers  3.  Ambulate 250' without AD with Mod [place].                    PLAN:    Patient to be seen 5 x/week  to address the above listed problems via therapeutic activities, therapeutic exercises, gait training  Plan of Care expires: 07/20/17  Plan of Care reviewed with: patient, daughter         Soraya Light, PT  06/21/2017

## 2017-06-22 LAB
ABO + RH BLD: NORMAL
ALBUMIN SERPL BCP-MCNC: 2.6 G/DL
ALP SERPL-CCNC: 92 U/L
ALT SERPL W/O P-5'-P-CCNC: 454 U/L
ANION GAP SERPL CALC-SCNC: 5 MMOL/L
AST SERPL-CCNC: 204 U/L
BASOPHILS # BLD AUTO: 0.01 K/UL
BASOPHILS NFR BLD: 0.1 %
BILIRUB SERPL-MCNC: 0.5 MG/DL
BLD GP AB SCN CELLS X3 SERPL QL: NORMAL
BLD PROD TYP BPU: NORMAL
BLOOD UNIT EXPIRATION DATE: NORMAL
BLOOD UNIT TYPE CODE: 5100
BLOOD UNIT TYPE CODE: 5100
BLOOD UNIT TYPE CODE: 9500
BLOOD UNIT TYPE: NORMAL
BUN SERPL-MCNC: 3 MG/DL
CALCIUM SERPL-MCNC: 8.1 MG/DL
CHLORIDE SERPL-SCNC: 103 MMOL/L
CO2 SERPL-SCNC: 27 MMOL/L
CODING SYSTEM: NORMAL
CREAT SERPL-MCNC: 0.7 MG/DL
DIFFERENTIAL METHOD: ABNORMAL
DISPENSE STATUS: NORMAL
EOSINOPHIL # BLD AUTO: 0 K/UL
EOSINOPHIL NFR BLD: 0.2 %
ERYTHROCYTE [DISTWIDTH] IN BLOOD BY AUTOMATED COUNT: 12.2 %
EST. GFR  (AFRICAN AMERICAN): >60 ML/MIN/1.73 M^2
EST. GFR  (NON AFRICAN AMERICAN): >60 ML/MIN/1.73 M^2
GLUCOSE SERPL-MCNC: 140 MG/DL
HCT VFR BLD AUTO: 18.4 %
HGB BLD-MCNC: 6.1 G/DL
LYMPHOCYTES # BLD AUTO: 0.6 K/UL
LYMPHOCYTES NFR BLD: 7 %
MAGNESIUM SERPL-MCNC: 2 MG/DL
MCH RBC QN AUTO: 32.3 PG
MCHC RBC AUTO-ENTMCNC: 33.2 %
MCV RBC AUTO: 97 FL
MONOCYTES # BLD AUTO: 0.8 K/UL
MONOCYTES NFR BLD: 9.4 %
NEUTROPHILS # BLD AUTO: 7 K/UL
NEUTROPHILS NFR BLD: 83.1 %
PHOSPHATE SERPL-MCNC: 2 MG/DL
PLATELET # BLD AUTO: 165 K/UL
PMV BLD AUTO: 9.5 FL
POTASSIUM SERPL-SCNC: 3.9 MMOL/L
PROT SERPL-MCNC: 5.3 G/DL
RBC # BLD AUTO: 1.89 M/UL
SODIUM SERPL-SCNC: 135 MMOL/L
TRANS ERYTHROCYTES VOL PATIENT: NORMAL ML
WBC # BLD AUTO: 8.41 K/UL

## 2017-06-22 PROCEDURE — 63600175 PHARM REV CODE 636 W HCPCS: Performed by: FAMILY MEDICINE

## 2017-06-22 PROCEDURE — 25000003 PHARM REV CODE 250: Performed by: SURGERY

## 2017-06-22 PROCEDURE — 97110 THERAPEUTIC EXERCISES: CPT

## 2017-06-22 PROCEDURE — P9021 RED BLOOD CELLS UNIT: HCPCS

## 2017-06-22 PROCEDURE — 86850 RBC ANTIBODY SCREEN: CPT

## 2017-06-22 PROCEDURE — 63600175 PHARM REV CODE 636 W HCPCS: Performed by: SURGERY

## 2017-06-22 PROCEDURE — 83735 ASSAY OF MAGNESIUM: CPT

## 2017-06-22 PROCEDURE — 25000003 PHARM REV CODE 250: Performed by: STUDENT IN AN ORGANIZED HEALTH CARE EDUCATION/TRAINING PROGRAM

## 2017-06-22 PROCEDURE — 97530 THERAPEUTIC ACTIVITIES: CPT

## 2017-06-22 PROCEDURE — 94761 N-INVAS EAR/PLS OXIMETRY MLT: CPT

## 2017-06-22 PROCEDURE — 86900 BLOOD TYPING SEROLOGIC ABO: CPT

## 2017-06-22 PROCEDURE — 85025 COMPLETE CBC W/AUTO DIFF WBC: CPT

## 2017-06-22 PROCEDURE — 11000001 HC ACUTE MED/SURG PRIVATE ROOM

## 2017-06-22 PROCEDURE — 84100 ASSAY OF PHOSPHORUS: CPT

## 2017-06-22 PROCEDURE — 36430 TRANSFUSION BLD/BLD COMPNT: CPT

## 2017-06-22 PROCEDURE — 86920 COMPATIBILITY TEST SPIN: CPT

## 2017-06-22 PROCEDURE — 80053 COMPREHEN METABOLIC PANEL: CPT

## 2017-06-22 RX ORDER — HYDROCODONE BITARTRATE AND ACETAMINOPHEN 500; 5 MG/1; MG/1
TABLET ORAL
Status: DISCONTINUED | OUTPATIENT
Start: 2017-06-22 | End: 2017-06-25 | Stop reason: HOSPADM

## 2017-06-22 RX ORDER — HYDROCODONE BITARTRATE AND ACETAMINOPHEN 500; 5 MG/1; MG/1
TABLET ORAL
Status: DISCONTINUED | OUTPATIENT
Start: 2017-06-22 | End: 2017-06-22

## 2017-06-22 RX ORDER — FUROSEMIDE 10 MG/ML
20 INJECTION INTRAMUSCULAR; INTRAVENOUS 2 TIMES DAILY
Status: COMPLETED | OUTPATIENT
Start: 2017-06-22 | End: 2017-06-22

## 2017-06-22 RX ADMIN — CYANOCOBALAMIN 1000 MCG: 1000 INJECTION, SOLUTION INTRAMUSCULAR; SUBCUTANEOUS at 08:06

## 2017-06-22 RX ADMIN — DEXTROSE MONOHYDRATE, SODIUM CHLORIDE, AND POTASSIUM CHLORIDE: 50; 4.5; 1.49 INJECTION, SOLUTION INTRAVENOUS at 01:06

## 2017-06-22 RX ADMIN — TRAMADOL HYDROCHLORIDE 50 MG: 50 TABLET, COATED ORAL at 01:06

## 2017-06-22 RX ADMIN — METOCLOPRAMIDE 10 MG: 5 INJECTION, SOLUTION INTRAMUSCULAR; INTRAVENOUS at 12:06

## 2017-06-22 RX ADMIN — PROGESTERONE 200 MG: 100 CAPSULE ORAL at 09:06

## 2017-06-22 RX ADMIN — FUROSEMIDE 20 MG: 10 INJECTION, SOLUTION INTRAMUSCULAR; INTRAVENOUS at 08:06

## 2017-06-22 RX ADMIN — ONDANSETRON 8 MG: 2 INJECTION INTRAMUSCULAR; INTRAVENOUS at 05:06

## 2017-06-22 RX ADMIN — FUROSEMIDE 20 MG: 10 INJECTION, SOLUTION INTRAMUSCULAR; INTRAVENOUS at 05:06

## 2017-06-22 RX ADMIN — SODIUM PHOSPHATE, MONOBASIC, MONOHYDRATE AND SODIUM PHOSPHATE, DIBASIC, ANHYDROUS 20.01 MMOL: 276; 142 INJECTION, SOLUTION INTRAVENOUS at 11:06

## 2017-06-22 RX ADMIN — THYROID, PORCINE 90 MG: 30 TABLET ORAL at 08:06

## 2017-06-22 RX ADMIN — FAMOTIDINE 20 MG: 20 TABLET, FILM COATED ORAL at 09:06

## 2017-06-22 RX ADMIN — FAMOTIDINE 20 MG: 20 TABLET, FILM COATED ORAL at 08:06

## 2017-06-22 RX ADMIN — METOCLOPRAMIDE 10 MG: 5 INJECTION, SOLUTION INTRAMUSCULAR; INTRAVENOUS at 05:06

## 2017-06-22 RX ADMIN — PREGABALIN 75 MG: 75 CAPSULE ORAL at 08:06

## 2017-06-22 RX ADMIN — PREGABALIN 75 MG: 75 CAPSULE ORAL at 09:06

## 2017-06-22 RX ADMIN — IRON SUCROSE 100 MG: 20 INJECTION, SOLUTION INTRAVENOUS at 08:06

## 2017-06-22 RX ADMIN — TRAMADOL HYDROCHLORIDE 50 MG: 50 TABLET, COATED ORAL at 03:06

## 2017-06-22 RX ADMIN — TRAMADOL HYDROCHLORIDE 50 MG: 50 TABLET, COATED ORAL at 08:06

## 2017-06-22 NOTE — PROGRESS NOTES
Informed Dr. Jimenez about patients critical hematocrit of 18.3 and requested changing pain medication from pca pump to something less sedating. Stated would call back with any further orders

## 2017-06-22 NOTE — PROGRESS NOTES
Progress Note    Admit Date: 6/19/2017   LOS: 3 days     SUBJECTIVE:     No issues overnight, continue ambulation and +flatus but no BM.    Scheduled Meds:   cyanocobalamin  1,000 mcg Subcutaneous Daily    epoetin bertha (PROCRIT) injection  10,000 Units Subcutaneous Every Mon, Wed, Fri    famotidine  20 mg Oral BID    iron sucrose (VENOFER) IVPB  100 mg Intravenous Daily    metoclopramide HCl  10 mg Intravenous Q6H    pregabalin  75 mg Oral BID    progesterone  200 mg Oral QHS    thyroid  90 mg Oral Daily     Continuous Infusions:   dextrose 5 % and 0.45 % NaCl with KCl 20 mEq 50 mL/hr at 06/22/17 0145     PRN Meds:sodium chloride, sodium chloride, albuterol sulfate, diphenhydrAMINE, hydrALAZINE, labetalol, naloxone, ondansetron, promethazine (PHENERGAN) IVPB, tramadol    Review of patient's allergies indicates:   Allergen Reactions    Ciprofloxacin Other (See Comments)     Nausea and joint pain      Epinephrine Other (See Comments)     Carcinoid patient    Levaquin [levofloxacin] Other (See Comments)     Nausea and joint pain    Morphine Nausea And Vomiting    Ciprofloxacin hcl     Demerol [meperidine]     Hydromorphone Nausea Only    Quinolones     Dilaudid [hydromorphone (bulk)] Nausea Only       Review of Systems  Denies headache, fever, chills, cp, sob, n/v, diarrhea, dysuria, and lower ext swelling      OBJECTIVE:     Vital Signs (Most Recent)  Temp: 98.6 °F (37 °C) (06/22/17 0420)  Pulse: 75 (06/22/17 0420)  Resp: 17 (06/22/17 0420)  BP: (!) 98/53 (06/22/17 0420)  SpO2: 95 % (06/22/17 0330)    Vital Signs Range (Last 24H):  Temp:  [98.6 °F (37 °C)-100 °F (37.8 °C)]   Pulse:  [75-97]   Resp:  [16-18]   BP: ()/(53-64)   SpO2:  [95 %-100 %]     I & O (Last 24H):    Intake/Output Summary (Last 24 hours) at 06/22/17 0723  Last data filed at 06/22/17 0600   Gross per 24 hour   Intake             1700 ml   Output             1240 ml   Net              460 ml     Physical  Exam:  .pe      Laboratory:  CBC:     Recent Labs  Lab 06/22/17  0600   WBC 8.41   RBC 1.89*   HGB 6.1*   HCT 18.4*      MCV 97   MCH 32.3*   MCHC 33.2     CMP:     Recent Labs  Lab 06/22/17  0600   *   CALCIUM 8.1*   ALBUMIN 2.6*   PROT 5.3*   *   K 3.9   CO2 27      BUN 3*   CREATININE 0.7   ALKPHOS 92   *   *   BILITOT 0.5           ASSESSMENT/PLAN:     Pt with malignant carcinoid of the liver s/p liver resection    - PT/OT  - monitor for BM  -+ flatus will advance clears    Demian Ta MD  PGY-2 FM  7:23 AM

## 2017-06-22 NOTE — PLAN OF CARE
Problem: Patient Care Overview  Goal: Plan of Care Review  Outcome: Ongoing (interventions implemented as appropriate)  Pt in NAD. AAOX4. Scheduled meds given per MAR. R subclavian TLC infusing D5 1/2 NS with 20 mEq of K at 75 and sandostatin at 2.5 mL hr per MD order dressing CDI. Pt reports mild pain relieved by PRN meds. Midline incision open to air edges approximated. TIMA drain to RUQ draining serosanguinous fluid. Pt ambulates to bathroom with assistance. Pt receiving 2L O2 via NC. NPO status maintained. Pt denies nausea. Encouraged to call for assistance verbalized understanding. Safety maintained bed in low locked position. SR up X2, bed alarm on, and call bell in reach. Will continue to monitor.

## 2017-06-22 NOTE — NURSING
Pt in NAD. Lab called reported critical hematocrit level:18.4 informed MD Dukes no new orders at this time will report to oncoming nurse who will monitor.

## 2017-06-22 NOTE — PLAN OF CARE
Problem: Occupational Therapy Goal  Goal: Occupational Therapy Goal  Goals to be met by: 7/20     Patient will increase functional independence with ADLs by performing:    UE Dressing with Modified [place].  LE Dressing with Modified [place].  Grooming while standing with Modified [place].  Toileting from toilet with Modified [place] for hygiene and clothing management.   Toilet transfer to toilet with Modified [place].  Indep HEP     Outcome: Ongoing (interventions implemented as appropriate)  Bed level activity performed 2/2 pt getting blood    Cont POC

## 2017-06-22 NOTE — PT/OT/SLP PROGRESS
"Occupational Therapy  Treatment    Krysten Muhammad   MRN: [id number]   Admitting Diagnosis: Metastatic malignant carcinoid tumor to liver    OT Date of Treatment: 06/22/17   OT Start Time: 1420  OT Stop Time: 1504  OT Total Time (min): 44 min    Billable Minutes:  Therapeutic Activity 30 and Therapeutic Exercise 14    General Precautions: Standard, fall  Orthopedic Precautions:    Braces:           Subjective:  Communicated with nurse prior to session.  Pt getting blood--bed level activity  Pain/Comfort  Pain Rating 1: 3/10  Location - Orientation 1: generalized  Location 1: abdomen  Pain Addressed 1: Reposition, Distraction  Pain Rating Post-Intervention 1: 1/10    Objective:     Therapeutic Activities and Exercises:  Pt bed flattened, pt performed stretching away from midline (hands overhead reach to wall, feet stretch to wall), pelvic mobility, anterior/post tilt, lateral hip hike, pelvic clock. Isometric contraction--abdominals, Oblique crunch, ER, scap mobility    AM-PAC 6 CLICK ADL   How much help from another person does this patient currently need?   1 = Unable, Total/Dependent Assistance  2 = A lot, Maximum/Moderate Assistance  3 = A little, Minimum/Contact Guard/Supervision  4 = None, Modified [place]/Independent    Putting on and taking off regular lower body clothing? : 3  Bathing (including washing, rinsing, drying)?: 3  Toileting, which includes using toilet, bedpan, or urinal? : 3  Putting on and taking off regular upper body clothing?: 3  Eating meals?: 3     AM-PAC Raw Score CMS "G-Code Modifier Level of Impairment Assistance   6 % Total / Unable   7 - 8 CM 80 - 100% Maximal Assist   9-13 CL 60 - 80% Moderate Assist   14 - 19 CK 40 - 60% Moderate Assist   20 - 22 CJ 20 - 40% Minimal Assist   23 CI 1-20% SBA / CGA   24 CH 0% Independent/ Mod I       Patient left supine with all lines intact, call button in reach and daughter present    ASSESSMENT:  Krysten Muhammad is a 57 y.o. female with a " medical diagnosis of Metastatic malignant carcinoid tumor to liver and presents with mobility restriction at this time 2/2 getting blood.  Continues to benefit from skilled OT.    Rehab identified problem list/impairments: Rehab identified problem list/impairments: weakness, impaired endurance, impaired self care skills, impaired functional mobilty, gait instability, impaired balance, pain, impaired skin    Rehab potential is excellent.    Activity tolerance: Good    Discharge recommendations: Discharge Facility/Level Of Care Needs: home     Barriers to discharge: Barriers to Discharge: None    Equipment recommendations: none     GOALS:    Occupational Therapy Goals        Problem: Occupational Therapy Goal    Goal Priority Disciplines Outcome Interventions   Occupational Therapy Goal     OT, PT/OT Ongoing (interventions implemented as appropriate)    Description:  Goals to be met by: 7/20     Patient will increase functional independence with ADLs by performing:    UE Dressing with Modified [place].  LE Dressing with Modified [place].  Grooming while standing with Modified [place].  Toileting from toilet with Modified [place] for hygiene and clothing management.   Toilet transfer to toilet with Modified [place].  Indep HEP                      Plan:  Patient to be seen 5 x/week to address the above listed problems via self-care/home management, therapeutic exercises, therapeutic activities  Plan of Care expires: 07/20/17  Plan of Care reviewed with: patient, daughter         Richardson URIBE Ethel, OT  06/22/2017

## 2017-06-22 NOTE — PLAN OF CARE
Problem: Patient Care Overview  Goal: Plan of Care Review  Outcome: Ongoing (interventions implemented as appropriate)  Pt in bed no acute distress noted no complaints voiced pain level 2/10 first of two units of blood infusing at this time daughter at bedside care continues

## 2017-06-22 NOTE — PROGRESS NOTES
.Pharmacy New Medication Education    Patient accepted medication education.    Pharmacy educated patient on the following medications, using the teach-back method.   Procrit  tramadol  Learners of pharmacy medication education included:  patient    Patient +/- learner response:  verbalize understanding

## 2017-06-22 NOTE — PLAN OF CARE
Problem: Patient Care Overview  Goal: Plan of Care Review  Outcome: Ongoing (interventions implemented as appropriate)  Patient on oxygen with documented flow.  Will attempt to wean per O2 order protocol. RN removed PCA pump and will DC ETCO2.

## 2017-06-23 LAB
ALBUMIN SERPL BCP-MCNC: 2.7 G/DL
ALP SERPL-CCNC: 127 U/L
ALT SERPL W/O P-5'-P-CCNC: 315 U/L
ANION GAP SERPL CALC-SCNC: 8 MMOL/L
AST SERPL-CCNC: 97 U/L
BASOPHILS # BLD AUTO: 0.01 K/UL
BASOPHILS NFR BLD: 0.1 %
BILIRUB SERPL-MCNC: 1.1 MG/DL
BUN SERPL-MCNC: 4 MG/DL
CALCIUM SERPL-MCNC: 8.5 MG/DL
CHLORIDE SERPL-SCNC: 99 MMOL/L
CO2 SERPL-SCNC: 28 MMOL/L
CREAT SERPL-MCNC: 0.8 MG/DL
DIFFERENTIAL METHOD: ABNORMAL
EOSINOPHIL # BLD AUTO: 0.1 K/UL
EOSINOPHIL NFR BLD: 1.9 %
ERYTHROCYTE [DISTWIDTH] IN BLOOD BY AUTOMATED COUNT: 15.7 %
EST. GFR  (AFRICAN AMERICAN): >60 ML/MIN/1.73 M^2
EST. GFR  (NON AFRICAN AMERICAN): >60 ML/MIN/1.73 M^2
GLUCOSE SERPL-MCNC: 116 MG/DL
HCT VFR BLD AUTO: 29.3 %
HGB BLD-MCNC: 10 G/DL
LYMPHOCYTES # BLD AUTO: 1.2 K/UL
LYMPHOCYTES NFR BLD: 15.7 %
MAGNESIUM SERPL-MCNC: 1.6 MG/DL
MCH RBC QN AUTO: 30.9 PG
MCHC RBC AUTO-ENTMCNC: 34.1 %
MCV RBC AUTO: 90 FL
MONOCYTES # BLD AUTO: 1 K/UL
MONOCYTES NFR BLD: 13.8 %
NEUTROPHILS # BLD AUTO: 5.1 K/UL
NEUTROPHILS NFR BLD: 68.5 %
PHOSPHATE SERPL-MCNC: 2.5 MG/DL
PLATELET # BLD AUTO: 211 K/UL
PLATELET BLD QL SMEAR: ABNORMAL
PMV BLD AUTO: 10.1 FL
POCT GLUCOSE: 134 MG/DL (ref 70–110)
POTASSIUM SERPL-SCNC: 3 MMOL/L
PROT SERPL-MCNC: 6.1 G/DL
RBC # BLD AUTO: 3.24 M/UL
SODIUM SERPL-SCNC: 135 MMOL/L
WBC # BLD AUTO: 7.53 K/UL

## 2017-06-23 PROCEDURE — 25000003 PHARM REV CODE 250: Performed by: FAMILY MEDICINE

## 2017-06-23 PROCEDURE — 25000003 PHARM REV CODE 250: Performed by: SURGERY

## 2017-06-23 PROCEDURE — 97116 GAIT TRAINING THERAPY: CPT

## 2017-06-23 PROCEDURE — 63600175 PHARM REV CODE 636 W HCPCS: Performed by: FAMILY MEDICINE

## 2017-06-23 PROCEDURE — 94761 N-INVAS EAR/PLS OXIMETRY MLT: CPT

## 2017-06-23 PROCEDURE — 80053 COMPREHEN METABOLIC PANEL: CPT

## 2017-06-23 PROCEDURE — 85025 COMPLETE CBC W/AUTO DIFF WBC: CPT

## 2017-06-23 PROCEDURE — 97530 THERAPEUTIC ACTIVITIES: CPT

## 2017-06-23 PROCEDURE — 84100 ASSAY OF PHOSPHORUS: CPT

## 2017-06-23 PROCEDURE — 63600175 PHARM REV CODE 636 W HCPCS: Performed by: SURGERY

## 2017-06-23 PROCEDURE — 97535 SELF CARE MNGMENT TRAINING: CPT

## 2017-06-23 PROCEDURE — 11000001 HC ACUTE MED/SURG PRIVATE ROOM

## 2017-06-23 PROCEDURE — 97110 THERAPEUTIC EXERCISES: CPT

## 2017-06-23 PROCEDURE — 25000003 PHARM REV CODE 250: Performed by: STUDENT IN AN ORGANIZED HEALTH CARE EDUCATION/TRAINING PROGRAM

## 2017-06-23 PROCEDURE — 83735 ASSAY OF MAGNESIUM: CPT

## 2017-06-23 RX ORDER — POTASSIUM CHLORIDE 14.9 MG/ML
20 INJECTION INTRAVENOUS
Status: COMPLETED | OUTPATIENT
Start: 2017-06-23 | End: 2017-06-23

## 2017-06-23 RX ORDER — FENTANYL 12.5 UG/1
1 PATCH TRANSDERMAL
Status: DISCONTINUED | OUTPATIENT
Start: 2017-06-23 | End: 2017-06-24

## 2017-06-23 RX ORDER — AMOXICILLIN 250 MG
1 CAPSULE ORAL 2 TIMES DAILY
Status: DISCONTINUED | OUTPATIENT
Start: 2017-06-23 | End: 2017-06-24

## 2017-06-23 RX ADMIN — MAGNESIUM SULFATE HEPTAHYDRATE 1 G: 500 INJECTION, SOLUTION INTRAMUSCULAR; INTRAVENOUS at 08:06

## 2017-06-23 RX ADMIN — DEXTROSE MONOHYDRATE, SODIUM CHLORIDE, AND POTASSIUM CHLORIDE: 50; 4.5; 1.49 INJECTION, SOLUTION INTRAVENOUS at 09:06

## 2017-06-23 RX ADMIN — POTASSIUM CHLORIDE 20 MEQ: 200 INJECTION, SOLUTION INTRAVENOUS at 08:06

## 2017-06-23 RX ADMIN — ERYTHROPOIETIN 10000 UNITS: 10000 INJECTION, SOLUTION INTRAVENOUS; SUBCUTANEOUS at 08:06

## 2017-06-23 RX ADMIN — PROGESTERONE 200 MG: 100 CAPSULE ORAL at 09:06

## 2017-06-23 RX ADMIN — CYANOCOBALAMIN 1000 MCG: 1000 INJECTION, SOLUTION INTRAMUSCULAR; SUBCUTANEOUS at 08:06

## 2017-06-23 RX ADMIN — FENTANYL 1 PATCH: 12.5 PATCH TRANSDERMAL at 09:06

## 2017-06-23 RX ADMIN — FAMOTIDINE 20 MG: 20 TABLET, FILM COATED ORAL at 09:06

## 2017-06-23 RX ADMIN — TRAMADOL HYDROCHLORIDE 50 MG: 50 TABLET, COATED ORAL at 05:06

## 2017-06-23 RX ADMIN — THYROID, PORCINE 90 MG: 30 TABLET ORAL at 08:06

## 2017-06-23 RX ADMIN — IRON SUCROSE 100 MG: 20 INJECTION, SOLUTION INTRAVENOUS at 08:06

## 2017-06-23 RX ADMIN — STANDARDIZED SENNA CONCENTRATE AND DOCUSATE SODIUM 1 TABLET: 8.6; 5 TABLET, FILM COATED ORAL at 10:06

## 2017-06-23 RX ADMIN — FAMOTIDINE 20 MG: 20 TABLET, FILM COATED ORAL at 08:06

## 2017-06-23 RX ADMIN — PREGABALIN 75 MG: 75 CAPSULE ORAL at 08:06

## 2017-06-23 RX ADMIN — POTASSIUM CHLORIDE 20 MEQ: 200 INJECTION, SOLUTION INTRAVENOUS at 09:06

## 2017-06-23 RX ADMIN — PREGABALIN 75 MG: 75 CAPSULE ORAL at 09:06

## 2017-06-23 NOTE — PLAN OF CARE
Problem: Physical Therapy Goal  Goal: Physical Therapy Goal  1.  Mod Independent bed mobility  2.  Mod Independent transfers  3.  Ambulate 250' without AD with Mod [place].   Outcome: Ongoing (interventions implemented as appropriate)  Continue working toward goals.

## 2017-06-23 NOTE — PROGRESS NOTES
.Pharmacy New Medication Education    Patient accepted medication education.    Pharmacy educated patient on the following medications, using the teach-back method.   [address]  Learners of pharmacy medication education included:  patient    Patient +/- learner response:  verbalize understanding

## 2017-06-23 NOTE — PT/OT/SLP PROGRESS
Physical Therapy  Treatment    Krysten Muhammad   MRN: [id number]   Admitting Diagnosis: Metastatic malignant carcinoid tumor to liver    PT Received On: 06/22/17  PT Start Time: 1325     PT Stop Time: 1338    PT Total Time (min): 13 min       Billable Minutes:  Therapeutic Exercise 13    Treatment Type: Treatment  PT/PTA: PTA     PTA Visit Number: 1       General Precautions: Standard, fall  Orthopedic Precautions: N/A   Braces:           Subjective:  Communicated with RN prior to session.  Pt agreed to tx.  Pt was receiving blood, so tx was limited to BLE therex in bed.    Pain/Comfort  Pain Rating 1: 0/10  Pain Rating Post-Intervention 1: 0/10 (no c/o pain during tx.)    Objective:   Patient found with: TIMA drain, peripheral IV    Functional Mobility:  Bed Mobility:   Rolling/Turning Right: Supervision  Scooting/Bridging: Supervision (up to HOB using LUE on headboard for assist and pushing with BLEs)    Therapeutic Activities and Exercises:  Supine BLE therex: AP, SAQs, hip ABD/ADD, glut sets, heelslides x 20 reps and SLRs x 15 reps.  VC's given for proper technique with occasional assist.     AM-PAC 6 CLICK MOBILITY  How much help from another person does this patient currently need?   1 = Unable, Total/Dependent Assistance  2 = A lot, Maximum/Moderate Assistance  3 = A little, Minimum/Contact Guard/Supervision  4 = None, Modified [place]/Independent    Turning over in bed (including adjusting bedclothes, sheets and blankets)?: 3  Sitting down on and standing up from a chair with arms (e.g., wheelchair, bedside commode, etc.): 3  Moving from lying on back to sitting on the side of the bed?: 3  Moving to and from a bed to a chair (including a wheelchair)?: 3  Need to walk in hospital room?: 3  Climbing 3-5 steps with a railing?: 3  Total Score: 18    AM-PAC Raw Score CMS G-Code Modifier Level of Impairment Assistance   6 % Total / Unable   7 - 9 CM 80 - 100% Maximal Assist   10 - 14 CL 60 - 80% Moderate  Assist   15 - 19 CK 40 - 60% Moderate Assist   20 - 22 CJ 20 - 40% Minimal Assist   23 CI 1-20% SBA / CGA   24 CH 0% Independent/ Mod I     Patient left supine with all lines intact, call button in reach, bed alarm on and RN notified.    Assessment:  Krysten Muhammad is a 57 y.o. female with a medical diagnosis of Metastatic malignant carcinoid tumor to liver and presents with limited mobility today secondary to receiving blood.  Pt would continue to benefit from P.T. To assist with return to PLOF.    Rehab identified problem list/impairments: Rehab identified problem list/impairments: weakness, impaired endurance, impaired functional mobilty, impaired self care skills, impaired skin    Rehab potential is excellent.    Activity tolerance: Good    Discharge recommendations: Discharge Facility/Level Of Care Needs: home     Barriers to discharge:      Equipment recommendations: Equipment Needed After Discharge: none     GOALS:    Physical Therapy Goals        Problem: Physical Therapy Goal    Goal Priority Disciplines Outcome Goal Variances Interventions   Physical Therapy Goal     PT/OT, PT Ongoing (interventions implemented as appropriate)     Description:  1.  Mod Independent bed mobility  2.  Mod Independent transfers  3.  Ambulate 250' without AD with Mod [place].                    PLAN:    Patient to be seen 5 x/week  to address the above listed problems via therapeutic activities, therapeutic exercises, gait training  Plan of Care expires: 07/20/17  Plan of Care reviewed with: patient, daughter         Savannah Bagley, PTA  06/23/2017

## 2017-06-23 NOTE — PT/OT/SLP PROGRESS
"Occupational Therapy  Treatment/Discharge    Krysten Muhammad   MRN: [id number]   Admitting Diagnosis: Metastatic malignant carcinoid tumor to liver    OT Date of Treatment: 06/23/17   OT Start Time: 1020  OT Stop Time: 1114  OT Total Time (min): 54 min    Billable Minutes:  Self Care/Home Management 15, Therapeutic Activity 24 and Therapeutic Exercise 15    General Precautions: Standard, fall  Orthopedic Precautions:    Braces:           Subjective:  Communicated with nurse prior to session.  "they said I might discharge today  Pain/Comfort  Pain Rating 1: 0/10  Pain Rating Post-Intervention 1: 0/10    Objective:        Functional Mobility:  Bed Mobility:  Rolling/Turning to Left: Modified independent  Rolling/Turning Right: Modified independent  Scooting/Bridging: Modified Independent  Supine to Sit: Modified Independent  Sit to Supine: Modified Independent    Transfers:   Sit <> Stand Assistance: Modified Independent  Sit <> Stand Assistive Device: No Assistive Device  Bed <> Chair Technique: Stand Pivot  Bed <> Chair Transfer Assistance: Modified Independent  Bed <> Chair Assistive Device: No Assistive Device  Toilet Transfer Technique: Stand Pivot  Toilet Transfer Assistance: Modified Independent  Toilet Transfer Assistive Device: No Assistive Device    Functional Ambulation: mod I in room pushing IV pole    Activities of Daily Living:  Feeding Level of Assistance: Independent  Feeding adaptive equipment:   UE Dressing Level of Assistance: Modified independent  UE adaptive equipment:   LE Dressing Level of Assistance: Modified independent  LE adaptive equipment:   Grooming Position: Standing  Grooming Level of Assistance: Modified independent  Toileting Where Assessed: Toilet  Toileting Level of Assistance: Modified independent        Bathing adaptive equipment:     Balance:   Static Sit: GOOD: Takes MODERATE challenges from all directions  Dynamic Sit: GOOD: Maintains balance through MODERATE excursions of active " "trunk movement  Static Stand: GOOD: Takes MODERATE challenges from all directions  Dynamic stand: GOOD+: Independent gait (with or without assistive device)    Therapeutic Activities and Exercises:  Pt given written copy of breathing ex, postural control ex. Pt able to demonstrate indep.  Pt educated on adaptive bed mobility tech to decrease pain--pt mod I.  Reviewed travel recs--there ex to perform, jacket/pillow for support in sitting, energy conservation/work simplification, sleep hygiene    AM-PAC 6 CLICK ADL   How much help from another person does this patient currently need?   1 = Unable, Total/Dependent Assistance  2 = A lot, Maximum/Moderate Assistance  3 = A little, Minimum/Contact Guard/Supervision  4 = None, Modified [place]/Independent    Putting on and taking off regular lower body clothing? : 4  Bathing (including washing, rinsing, drying)?: 4  Toileting, which includes using toilet, bedpan, or urinal? : 4  Putting on and taking off regular upper body clothing?: 4  Taking care of personal grooming such as brushing teeth?: 4  Eating meals?: 4  Total Score: 24     AM-PAC Raw Score CMS "G-Code Modifier Level of Impairment Assistance   6 % Total / Unable   7 - 8 CM 80 - 100% Maximal Assist   9-13 CL 60 - 80% Moderate Assist   14 - 19 CK 40 - 60% Moderate Assist   20 - 22 CJ 20 - 40% Minimal Assist   23 CI 1-20% SBA / CGA   24 CH 0% Independent/ Mod I       Patient left HOB elevated with all lines intact, call button in reach and daughter present    ASSESSMENT:  Krysten Muhammad is a 57 y.o. female with a medical diagnosis of Metastatic malignant carcinoid tumor to liver and presents with decreased endurance. Pt with good understanding of all info discussed.  Pt will be discharged from OT at this time..    Rehab identified problem list/impairments: Rehab identified problem list/impairments: impaired endurance    Rehab potential is excellent.    Activity tolerance: Good    Discharge recommendations: " Discharge Facility/Level Of Care Needs: home     Barriers to discharge: Barriers to Discharge: None    Equipment recommendations: none     GOALS:    Occupational Therapy Goals        Problem: Occupational Therapy Goal    Goal Priority Disciplines Outcome Interventions   Occupational Therapy Goal     OT, PT/OT Ongoing (interventions implemented as appropriate)    Description:  Goals to be met by: 7/20     Patient will increase functional independence with ADLs by performing:    UE Dressing with Modified [place].  LE Dressing with Modified [place].  Grooming while standing with Modified [place].  Toileting from toilet with Modified [place] for hygiene and clothing management.   Toilet transfer to toilet with Modified [place].  Indep HEP                      Plan:  Discharge OT  Plan of Care reviewed with: patient, daughter         Richardson URIBE Ethel OT  06/23/2017

## 2017-06-23 NOTE — PT/OT/SLP PROGRESS
Physical Therapy  Treatment    Krysten Muhammad   MRN: [id number]   Admitting Diagnosis: Metastatic malignant carcinoid tumor to liver    PT Received On: 06/23/17  PT Start Time: 1430     PT Stop Time: 1455    PT Total Time (min): 25 min       Billable Minutes:  Gait [id number] and Therapeutic Activity 9    Treatment Type: Treatment  PT/PTA: PTA     PTA Visit Number: 2       General Precautions: Standard, fall  Orthopedic Precautions: N/A   Braces:           Subjective:  Communicated with RN (Renetta) prior to session.  Pt agreed to tx.  nsg reports that pt was experiencing some confustion.  Nurse entered the room with PTA.  Pt's daughter and nurse stated that pt was better than earlier, but still a little confused.    Pain/Comfort  Pain Rating 1: 0/10  Location - Orientation 1: generalized  Location 1: abdomen  Pain Addressed 1: Reposition, Distraction, Nurse notified  Pain Rating Post-Intervention 1: 6/10    Objective:   Patient found with: peripheral IV    Functional Mobility:  Bed Mobility:   Rolling/Turning Right: Supervision  Scooting/Bridging: Supervision  Supine to Sit: Stand by Assistance (HOB up 35* and b/r for assist)    Transfers:  Sit <> Stand Assistance: Stand By Assistance  Sit <> Stand Assistive Device: No Assistive Device, Rolling Walker  Bed <> Chair Technique: Stand Pivot  Bed <> Chair Assistance: Contact Guard Assistance  Bed <> Chair Assistive Device: No Assistive Device    Gait:   Gait Distance: 300ft total - 100ft with RW and SBA/CGA and 200ft without A.D. and CGA with assistance for IV pole.  Assistance 1: Contact Guard Assistance, Stand by Assistance  Gait Assistive Device: No device, Rolling walker  Gait Pattern: reciprocal  Gait Deviation(s): forward lean    Stairs:    Balance:   Static Sit: GOOD: Takes MODERATE challenges from all directions  Dynamic Sit: GOOD: Maintains balance through MODERATE excursions of active trunk movement  Static Stand: GOOD: Takes MODERATE challenges from all  directions  Dynamic stand: FAIR: Needs CONTACT GUARD during gait CGA without A.D. And SBA with RW    Therapeutic Activities and Exercises:   Static standing with SBA without A.D. To assist pt with donning and doffing her robe.  T/F EOB <> b/s chair with CGA and stand pivot.  AMB: as above.  Pt tolerated ambulating without a RW well.  1 or 2 bouts of initial unsteadiness.  Pt still advised to use a RW when ambulating without P.T.     AM-PAC 6 CLICK MOBILITY  How much help from another person does this patient currently need?   1 = Unable, Total/Dependent Assistance  2 = A lot, Maximum/Moderate Assistance  3 = A little, Minimum/Contact Guard/Supervision  4 = None, Modified [place]/Independent    Turning over in bed (including adjusting bedclothes, sheets and blankets)?: 3  Sitting down on and standing up from a chair with arms (e.g., wheelchair, bedside commode, etc.): 3  Moving from lying on back to sitting on the side of the bed?: 3  Moving to and from a bed to a chair (including a wheelchair)?: 3  Need to walk in hospital room?: 3  Climbing 3-5 steps with a railing?: 3  Total Score: 18    AM-PAC Raw Score CMS G-Code Modifier Level of Impairment Assistance   6 % Total / Unable   7 - 9 CM 80 - 100% Maximal Assist   10 - 14 CL 60 - 80% Moderate Assist   15 - 19 CK 40 - 60% Moderate Assist   20 - 22 CJ 20 - 40% Minimal Assist   23 CI 1-20% SBA / CGA   24 CH 0% Independent/ Mod I     Patient left up in chair with all lines intact, call button in reach and RN notified.    Assessment:  Krysten Muhammad is a 57 y.o. female with a medical diagnosis of Metastatic malignant carcinoid tumor to liver and presents with decreased endurance, overall strength, and mild confusion today.  Pt was oriented x 4, but had mild confusion about events from yesterday and earlier today.  Pt demonstrated an increase in ambulation distance today.  Continue working on improving balance by ambulating without A.D. During tx.  Pt would  continue to benefit from P.T. To assist with return to PLOF.    Rehab identified problem list/impairments: Rehab identified problem list/impairments: impaired endurance, gait instability    Rehab potential is excellent.    Activity tolerance: Good    Discharge recommendations: Discharge Facility/Level Of Care Needs: home     Barriers to discharge: Barriers to Discharge: None    Equipment recommendations: Equipment Needed After Discharge: none     GOALS:    Physical Therapy Goals        Problem: Physical Therapy Goal    Goal Priority Disciplines Outcome Goal Variances Interventions   Physical Therapy Goal     PT/OT, PT Ongoing (interventions implemented as appropriate)     Description:  1.  Mod Independent bed mobility  2.  Mod Independent transfers  3.  Ambulate 250' without AD with Mod [place].                    PLAN:    Patient to be seen 5 x/week  to address the above listed problems via therapeutic activities, therapeutic exercises, gait training  Plan of Care expires: 07/20/17  Plan of Care reviewed with: patient, daughter         Savannah Bagley, PTA  06/23/2017

## 2017-06-23 NOTE — PROGRESS NOTES
Progress Note    Admit Date: 6/19/2017   LOS: 4 days     SUBJECTIVE:     No issues overnight    Scheduled Meds:   cyanocobalamin  1,000 mcg Subcutaneous Daily    epoetin bertha (PROCRIT) injection  10,000 Units Subcutaneous Every Mon, Wed, Fri    famotidine  20 mg Oral BID    iron sucrose (VENOFER) IVPB  100 mg Intravenous Daily    pregabalin  75 mg Oral BID    progesterone  200 mg Oral QHS    thyroid  90 mg Oral Daily     Continuous Infusions:   dextrose 5 % and 0.45 % NaCl with KCl 20 mEq Stopped (06/22/17 1200)     PRN Meds:sodium chloride, albuterol sulfate, diphenhydrAMINE, hydrALAZINE, labetalol, naloxone, ondansetron, promethazine (PHENERGAN) IVPB, tramadol    Review of patient's allergies indicates:   Allergen Reactions    Ciprofloxacin Other (See Comments)     Nausea and joint pain      Epinephrine Other (See Comments)     Carcinoid patient    Levaquin [levofloxacin] Other (See Comments)     Nausea and joint pain    Morphine Nausea And Vomiting    Ciprofloxacin hcl     Demerol [meperidine]     Hydromorphone Nausea Only    Quinolones     Dilaudid [hydromorphone (bulk)] Nausea Only       Review of Systems  Denies headache, fever, chills, cp, sob, n/v, diarrhea, dysuria, and lower ext swelling      OBJECTIVE:     Vital Signs (Most Recent)  Temp: 98.3 °F (36.8 °C) (06/23/17 0420)  Pulse: 70 (06/23/17 0420)  Resp: 16 (06/23/17 0420)  BP: 133/73 (06/23/17 0420)  SpO2: 95 % (06/23/17 0309)    Vital Signs Range (Last 24H):  Temp:  [98 °F (36.7 °C)-99.3 °F (37.4 °C)]   Pulse:  [70-85]   Resp:  [16-18]   BP: (113-141)/(58-79)   SpO2:  [95 %-100 %]   Arterial Line BP: (125)/(63)     I & O (Last 24H):    Intake/Output Summary (Last 24 hours) at 06/23/17 0718  Last data filed at 06/23/17 0420   Gross per 24 hour   Intake           3177.5 ml   Output             2570 ml   Net            607.5 ml     Physical Exam:  .pejp      Laboratory:  CBC:     Recent Labs  Lab 06/22/17  0600 06/23/17  0429   WBC 8.41   --    RBC 1.89* 3.24*   HGB 6.1* 10.0*   HCT 18.4* 29.3*    211   MCV 97 90   MCH 32.3* 30.9   MCHC 33.2 34.1     CMP:     Recent Labs  Lab 06/23/17  0429   *   CALCIUM 8.5*   ALBUMIN 2.7*   PROT 6.1   *   K 3.0*   CO2 28   CL 99   BUN 4*   CREATININE 0.8   ALKPHOS 127   *   AST 97*   BILITOT 1.1*           ASSESSMENT/PLAN:     Pt with malignant carcinoid of the liver s/p liver resection    - PT/OT  - monitor for BM  - 20cc out TIMA drain, pull today    Demian Ta MD  PGY-2 FM  7:23 AM

## 2017-06-23 NOTE — PLAN OF CARE
Problem: Physical Therapy Goal  Goal: Physical Therapy Goal  1.  Mod Independent bed mobility  2.  Mod Independent transfers  3.  Ambulate 250' without AD with Mod [place].   Outcome: Ongoing (interventions implemented as appropriate)  Continue working toward goals.

## 2017-06-23 NOTE — PLAN OF CARE
Problem: Occupational Therapy Goal  Goal: Occupational Therapy Goal  Goals to be met by: 7/20     Patient will increase functional independence with ADLs by performing:    UE Dressing with Modified [place].  LE Dressing with Modified [place].  Grooming while standing with Modified [place].  Toileting from toilet with Modified [place] for hygiene and clothing management.   Toilet transfer to toilet with Modified [place].  Indep HEP     Outcome: Ongoing (interventions implemented as appropriate)  Pt is mod I ADLs and basic mobility at this time.  Pt given written handouts and pt/daughter with good understanding of HEP.    Discharge OT services

## 2017-06-24 LAB
ALBUMIN SERPL BCP-MCNC: 2.6 G/DL
ALP SERPL-CCNC: 206 U/L
ALT SERPL W/O P-5'-P-CCNC: 205 U/L
ANION GAP SERPL CALC-SCNC: 7 MMOL/L
AST SERPL-CCNC: 55 U/L
BASOPHILS # BLD AUTO: 0.01 K/UL
BASOPHILS NFR BLD: 0.2 %
BILIRUB SERPL-MCNC: 0.7 MG/DL
BUN SERPL-MCNC: 5 MG/DL
CALCIUM SERPL-MCNC: 8.7 MG/DL
CHLORIDE SERPL-SCNC: 104 MMOL/L
CO2 SERPL-SCNC: 25 MMOL/L
CREAT SERPL-MCNC: 0.7 MG/DL
DIFFERENTIAL METHOD: ABNORMAL
EOSINOPHIL # BLD AUTO: 0.1 K/UL
EOSINOPHIL NFR BLD: 1.4 %
ERYTHROCYTE [DISTWIDTH] IN BLOOD BY AUTOMATED COUNT: 15.2 %
EST. GFR  (AFRICAN AMERICAN): >60 ML/MIN/1.73 M^2
EST. GFR  (NON AFRICAN AMERICAN): >60 ML/MIN/1.73 M^2
GLUCOSE SERPL-MCNC: 106 MG/DL
HCT VFR BLD AUTO: 28.1 %
HGB BLD-MCNC: 9.5 G/DL
LYMPHOCYTES # BLD AUTO: 1.1 K/UL
LYMPHOCYTES NFR BLD: 16.8 %
MAGNESIUM SERPL-MCNC: 2.1 MG/DL
MCH RBC QN AUTO: 30.8 PG
MCHC RBC AUTO-ENTMCNC: 33.8 %
MCV RBC AUTO: 91 FL
MONOCYTES # BLD AUTO: 1 K/UL
MONOCYTES NFR BLD: 16.1 %
NEUTROPHILS # BLD AUTO: 4.1 K/UL
NEUTROPHILS NFR BLD: 64.1 %
PHOSPHATE SERPL-MCNC: 3.3 MG/DL
PLATELET # BLD AUTO: 215 K/UL
PMV BLD AUTO: 9.7 FL
POTASSIUM SERPL-SCNC: 4.1 MMOL/L
PROT SERPL-MCNC: 6 G/DL
RBC # BLD AUTO: 3.08 M/UL
SODIUM SERPL-SCNC: 136 MMOL/L
WBC # BLD AUTO: 6.38 K/UL

## 2017-06-24 PROCEDURE — 63600175 PHARM REV CODE 636 W HCPCS: Performed by: SURGERY

## 2017-06-24 PROCEDURE — 80053 COMPREHEN METABOLIC PANEL: CPT

## 2017-06-24 PROCEDURE — 63600175 PHARM REV CODE 636 W HCPCS: Performed by: FAMILY MEDICINE

## 2017-06-24 PROCEDURE — 25000003 PHARM REV CODE 250: Performed by: SURGERY

## 2017-06-24 PROCEDURE — 85025 COMPLETE CBC W/AUTO DIFF WBC: CPT

## 2017-06-24 PROCEDURE — 83735 ASSAY OF MAGNESIUM: CPT

## 2017-06-24 PROCEDURE — 84100 ASSAY OF PHOSPHORUS: CPT

## 2017-06-24 PROCEDURE — 25000003 PHARM REV CODE 250: Performed by: FAMILY MEDICINE

## 2017-06-24 PROCEDURE — 94761 N-INVAS EAR/PLS OXIMETRY MLT: CPT

## 2017-06-24 PROCEDURE — 11000001 HC ACUTE MED/SURG PRIVATE ROOM

## 2017-06-24 RX ORDER — OXYCODONE HYDROCHLORIDE 5 MG/1
5 TABLET ORAL EVERY 6 HOURS PRN
Status: DISCONTINUED | OUTPATIENT
Start: 2017-06-24 | End: 2017-06-25 | Stop reason: HOSPADM

## 2017-06-24 RX ORDER — FENTANYL 12.5 UG/1
1 PATCH TRANSDERMAL
Status: DISCONTINUED | OUTPATIENT
Start: 2017-06-24 | End: 2017-06-24

## 2017-06-24 RX ORDER — KETOROLAC TROMETHAMINE 30 MG/ML
15 INJECTION, SOLUTION INTRAMUSCULAR; INTRAVENOUS EVERY 6 HOURS
Status: COMPLETED | OUTPATIENT
Start: 2017-06-24 | End: 2017-06-25

## 2017-06-24 RX ORDER — FENTANYL 12.5 UG/1
1 PATCH TRANSDERMAL
Status: DISCONTINUED | OUTPATIENT
Start: 2017-06-24 | End: 2017-06-25 | Stop reason: HOSPADM

## 2017-06-24 RX ORDER — BISACODYL 10 MG
10 SUPPOSITORY, RECTAL RECTAL DAILY
Status: DISCONTINUED | OUTPATIENT
Start: 2017-06-24 | End: 2017-06-25 | Stop reason: HOSPADM

## 2017-06-24 RX ORDER — BISACODYL 10 MG
10 SUPPOSITORY, RECTAL RECTAL DAILY PRN
Status: DISCONTINUED | OUTPATIENT
Start: 2017-06-24 | End: 2017-06-24

## 2017-06-24 RX ORDER — AMOXICILLIN 250 MG
2 CAPSULE ORAL 2 TIMES DAILY
Status: DISCONTINUED | OUTPATIENT
Start: 2017-06-24 | End: 2017-06-25 | Stop reason: HOSPADM

## 2017-06-24 RX ADMIN — BISACODYL 10 MG: 10 SUPPOSITORY RECTAL at 01:06

## 2017-06-24 RX ADMIN — PROGESTERONE 200 MG: 100 CAPSULE ORAL at 08:06

## 2017-06-24 RX ADMIN — CYANOCOBALAMIN 1000 MCG: 1000 INJECTION, SOLUTION INTRAMUSCULAR; SUBCUTANEOUS at 08:06

## 2017-06-24 RX ADMIN — FAMOTIDINE 20 MG: 20 TABLET, FILM COATED ORAL at 08:06

## 2017-06-24 RX ADMIN — THYROID, PORCINE 90 MG: 30 TABLET ORAL at 08:06

## 2017-06-24 RX ADMIN — PREGABALIN 75 MG: 75 CAPSULE ORAL at 08:06

## 2017-06-24 RX ADMIN — IRON SUCROSE 100 MG: 20 INJECTION, SOLUTION INTRAVENOUS at 08:06

## 2017-06-24 RX ADMIN — STANDARDIZED SENNA CONCENTRATE AND DOCUSATE SODIUM 2 TABLET: 8.6; 5 TABLET, FILM COATED ORAL at 08:06

## 2017-06-24 RX ADMIN — KETOROLAC TROMETHAMINE 15 MG: 30 INJECTION, SOLUTION INTRAMUSCULAR at 05:06

## 2017-06-24 RX ADMIN — KETOROLAC TROMETHAMINE 15 MG: 30 INJECTION, SOLUTION INTRAMUSCULAR at 11:06

## 2017-06-24 NOTE — PROGRESS NOTES
Progress Note    Admit Date: 6/19/2017   LOS: 5 days     SUBJECTIVE:     Pt with hallucinations overnight 2/2 to narcotics, will adjust pain med regimen, otherwise this morning feeling well and behaving appropriately     Scheduled Meds:   cyanocobalamin  1,000 mcg Subcutaneous Daily    epoetin bertha (PROCRIT) injection  10,000 Units Subcutaneous Every Mon, Wed, Fri    famotidine  20 mg Oral BID    iron sucrose (VENOFER) IVPB  100 mg Intravenous Daily    ketorolac  15 mg Intravenous Q6H    pregabalin  75 mg Oral BID    progesterone  200 mg Oral QHS    senna-docusate 8.6-50 mg  2 tablet Oral BID    thyroid  90 mg Oral Daily     Continuous Infusions:   dextrose 5 % and 0.45 % NaCl with KCl 20 mEq 50 mL/hr at 06/23/17 9794     PRN Meds:sodium chloride, albuterol sulfate, diphenhydrAMINE, diphenhydrAMINE-zinc acetate 1-0.1%, fentanyl, hydrALAZINE, labetalol, naloxone, ondansetron, oxycodone, promethazine (PHENERGAN) IVPB    Review of patient's allergies indicates:   Allergen Reactions    Ciprofloxacin Other (See Comments)     Nausea and joint pain      Epinephrine Other (See Comments)     Carcinoid patient    Levaquin [levofloxacin] Other (See Comments)     Nausea and joint pain    Morphine Nausea And Vomiting    Ciprofloxacin hcl     Demerol [meperidine]     Hydromorphone Nausea Only    Quinolones     Dilaudid [hydromorphone (bulk)] Nausea Only       Review of Systems  Denies headache, fever, chills, cp, sob, n/v, diarrhea, dysuria, and lower ext swelling      OBJECTIVE:     Vital Signs (Most Recent)  Temp: 97.9 °F (36.6 °C) (06/24/17 0746)  Pulse: 80 (06/24/17 0746)  Resp: 18 (06/24/17 0746)  BP: 127/63 (06/24/17 0746)  SpO2: 96 % (06/24/17 0446)    Vital Signs Range (Last 24H):  Temp:  [97.9 °F (36.6 °C)-99.2 °F (37.3 °C)]   Pulse:  [75-84]   Resp:  [18-20]   BP: (116-128)/(57-66)   SpO2:  [95 %-97 %]     I & O (Last 24H):    Intake/Output Summary (Last 24 hours) at 06/24/17 0882  Last data filed at  06/24/17 0600   Gross per 24 hour   Intake             1000 ml   Output             2445 ml   Net            -1445 ml     Physical Exam:  .[facility]      Laboratory:  CBC:     Recent Labs  Lab 06/24/17  0524   WBC 6.38   RBC 3.08*   HGB 9.5*   HCT 28.1*      MCV 91   MCH 30.8   MCHC 33.8     CMP:     Recent Labs  Lab 06/24/17  0524      CALCIUM 8.7   ALBUMIN 2.6*   PROT 6.0      K 4.1   CO2 25      BUN 5*   CREATININE 0.7   ALKPHOS 206*   *   AST 55*   BILITOT 0.7           ASSESSMENT/PLAN:     Pt with malignant carcinoid of the liver s/p liver resection    - PT/OT  - monitor for BM  - 45cc out TIMA drain  - will trial toradol for pain today to alleviate need for narcotics which adverse side effects on this patient    Demian Ta MD  PGY-2   8:53 AM

## 2017-06-24 NOTE — PLAN OF CARE
"Problem: Patient Care Overview  Goal: Plan of Care Review  Outcome: Revised  Patient is awake, alert, and oriented.  States, "didn't know why she was in the room when she returned to room from walking."  Daughter and daughter's friend at bedside.  Fentanyl patch removed.  On scheduled Toradol and patient states, "I don't have pain with Toradol."  TIMA Drain with 10cc of serosanguinous drainage this shift.  Central line removed as per MD order.  Abdominal midline incision is open to air, clean, dry, and intact.  Safety maintained.  Will continue to monitor.      "

## 2017-06-25 VITALS
DIASTOLIC BLOOD PRESSURE: 57 MMHG | HEART RATE: 70 BPM | WEIGHT: 168 LBS | SYSTOLIC BLOOD PRESSURE: 114 MMHG | OXYGEN SATURATION: 95 % | BODY MASS INDEX: 29.77 KG/M2 | HEIGHT: 63 IN | TEMPERATURE: 99 F | RESPIRATION RATE: 19 BRPM

## 2017-06-25 LAB
ALBUMIN SERPL BCP-MCNC: 2.6 G/DL
ALP SERPL-CCNC: 227 U/L
ALT SERPL W/O P-5'-P-CCNC: 150 U/L
ANION GAP SERPL CALC-SCNC: 7 MMOL/L
AST SERPL-CCNC: 37 U/L
BASOPHILS # BLD AUTO: 0.01 K/UL
BASOPHILS NFR BLD: 0.2 %
BILIRUB SERPL-MCNC: 0.7 MG/DL
BUN SERPL-MCNC: 7 MG/DL
CALCIUM SERPL-MCNC: 8.9 MG/DL
CHLORIDE SERPL-SCNC: 102 MMOL/L
CO2 SERPL-SCNC: 26 MMOL/L
CREAT SERPL-MCNC: 0.8 MG/DL
DIFFERENTIAL METHOD: ABNORMAL
EOSINOPHIL # BLD AUTO: 0.1 K/UL
EOSINOPHIL NFR BLD: 2.1 %
ERYTHROCYTE [DISTWIDTH] IN BLOOD BY AUTOMATED COUNT: 15 %
EST. GFR  (AFRICAN AMERICAN): >60 ML/MIN/1.73 M^2
EST. GFR  (NON AFRICAN AMERICAN): >60 ML/MIN/1.73 M^2
GLUCOSE SERPL-MCNC: 99 MG/DL
HCT VFR BLD AUTO: 30.1 %
HGB BLD-MCNC: 10.1 G/DL
LYMPHOCYTES # BLD AUTO: 0.7 K/UL
LYMPHOCYTES NFR BLD: 12.1 %
MAGNESIUM SERPL-MCNC: 1.9 MG/DL
MCH RBC QN AUTO: 31.1 PG
MCHC RBC AUTO-ENTMCNC: 33.6 %
MCV RBC AUTO: 93 FL
MONOCYTES # BLD AUTO: 0.8 K/UL
MONOCYTES NFR BLD: 14 %
NEUTROPHILS # BLD AUTO: 4 K/UL
NEUTROPHILS NFR BLD: 70.5 %
PHOSPHATE SERPL-MCNC: 4 MG/DL
PLATELET # BLD AUTO: 247 K/UL
PMV BLD AUTO: 9.8 FL
POTASSIUM SERPL-SCNC: 4.3 MMOL/L
PROT SERPL-MCNC: 6.2 G/DL
RBC # BLD AUTO: 3.25 M/UL
SODIUM SERPL-SCNC: 135 MMOL/L
WBC # BLD AUTO: 5.63 K/UL

## 2017-06-25 PROCEDURE — 25000003 PHARM REV CODE 250: Performed by: FAMILY MEDICINE

## 2017-06-25 PROCEDURE — 25000003 PHARM REV CODE 250: Performed by: SURGERY

## 2017-06-25 PROCEDURE — 85025 COMPLETE CBC W/AUTO DIFF WBC: CPT

## 2017-06-25 PROCEDURE — 36415 COLL VENOUS BLD VENIPUNCTURE: CPT

## 2017-06-25 PROCEDURE — 80053 COMPREHEN METABOLIC PANEL: CPT

## 2017-06-25 PROCEDURE — 63600175 PHARM REV CODE 636 W HCPCS: Performed by: SURGERY

## 2017-06-25 PROCEDURE — 84100 ASSAY OF PHOSPHORUS: CPT

## 2017-06-25 PROCEDURE — 63600175 PHARM REV CODE 636 W HCPCS: Performed by: FAMILY MEDICINE

## 2017-06-25 PROCEDURE — 94761 N-INVAS EAR/PLS OXIMETRY MLT: CPT

## 2017-06-25 PROCEDURE — 83735 ASSAY OF MAGNESIUM: CPT

## 2017-06-25 RX ORDER — METOCLOPRAMIDE 10 MG/1
10 TABLET ORAL
Qty: 30 TABLET | Refills: 1 | Status: SHIPPED | OUTPATIENT
Start: 2017-06-25 | End: 2017-06-27

## 2017-06-25 RX ORDER — METOCLOPRAMIDE 10 MG/1
10 TABLET ORAL
Status: DISCONTINUED | OUTPATIENT
Start: 2017-06-25 | End: 2017-06-25 | Stop reason: HOSPADM

## 2017-06-25 RX ORDER — AMOXICILLIN 250 MG
2 CAPSULE ORAL 2 TIMES DAILY
COMMUNITY
Start: 2017-06-25

## 2017-06-25 RX ORDER — OXYCODONE AND ACETAMINOPHEN 10; 325 MG/1; MG/1
1 TABLET ORAL EVERY 6 HOURS PRN
Qty: 20 TABLET | Refills: 0 | Status: SHIPPED | OUTPATIENT
Start: 2017-06-25 | End: 2017-06-27

## 2017-06-25 RX ADMIN — KETOROLAC TROMETHAMINE 15 MG: 30 INJECTION, SOLUTION INTRAMUSCULAR at 12:06

## 2017-06-25 RX ADMIN — STANDARDIZED SENNA CONCENTRATE AND DOCUSATE SODIUM 2 TABLET: 8.6; 5 TABLET, FILM COATED ORAL at 08:06

## 2017-06-25 RX ADMIN — OXYCODONE HYDROCHLORIDE 5 MG: 5 TABLET ORAL at 11:06

## 2017-06-25 RX ADMIN — IRON SUCROSE 100 MG: 20 INJECTION, SOLUTION INTRAVENOUS at 08:06

## 2017-06-25 RX ADMIN — THYROID, PORCINE 90 MG: 30 TABLET ORAL at 08:06

## 2017-06-25 RX ADMIN — BISACODYL 10 MG: 10 SUPPOSITORY RECTAL at 08:06

## 2017-06-25 RX ADMIN — FAMOTIDINE 20 MG: 20 TABLET, FILM COATED ORAL at 08:06

## 2017-06-25 RX ADMIN — METOCLOPRAMIDE 10 MG: 10 TABLET ORAL at 11:06

## 2017-06-25 RX ADMIN — CYANOCOBALAMIN 1000 MCG: 1000 INJECTION, SOLUTION INTRAMUSCULAR; SUBCUTANEOUS at 08:06

## 2017-06-25 RX ADMIN — OXYCODONE HYDROCHLORIDE 5 MG: 5 TABLET ORAL at 06:06

## 2017-06-25 NOTE — PLAN OF CARE
Discharge orders noted, no HH or HME ordered.    Future Appointments  Date Time Provider Department Center   9/26/2017 11:30 AM [facility] CT1 LIMIT 400 LBS [facility] CT SCAN [facility]   9/26/2017 1:00 PM [facility] MRI1 [facility] MRI [facility]   9/27/2017 11:30 AM Thad White MD [facility] TUMOR [facility]          06/25/17 1135   Final Note   Assessment Type Final Discharge Note   Discharge Disposition Home   What phone number can be called within the next 1-3 days to see how you are doing after discharge? [phone]   Hospital Follow Up  Appt(s) scheduled? No  (offices closed, [place] to follow up)   [facility]? n/a   Discharge/Hospital Encounter Summary to (non-[facility]) PCP Yes   Referral to Outpatient Case Management complete? n/a   Referral to / orders for Home Health Complete? n/a   30 day supply of medicines given at discharge, if documented non-compliance / non-adherence? n/a   Any social issues identified prior to discharge? n/a   Did you assess the readiness or willingness of the family or caregiver to support self management of care? n/a   Right Care Referral Info   Post Acute Recommendation No Care     Juany Ramirez, RN Transitional Navigator  [phone]

## 2017-06-25 NOTE — PROGRESS NOTES
Progress Note    Admit Date: 6/19/2017   LOS: 6 days     SUBJECTIVE:     Per nursing staff patient continues become altered 2/2 narcotics, otherwise no issues overnight.    Scheduled Meds:   bisacodyl  10 mg Rectal Daily    cyanocobalamin  1,000 mcg Subcutaneous Daily    epoetin bertha (PROCRIT) injection  10,000 Units Subcutaneous Every Mon, Wed, Fri    famotidine  20 mg Oral BID    fentanyl  1 patch Transdermal Q72H    iron sucrose (VENOFER) IVPB  100 mg Intravenous Daily    metoclopramide HCl  10 mg Oral TID AC    progesterone  200 mg Oral QHS    senna-docusate 8.6-50 mg  2 tablet Oral BID    thyroid  90 mg Oral Daily     Continuous Infusions:     PRN Meds:sodium chloride, albuterol sulfate, diphenhydrAMINE, diphenhydrAMINE-zinc acetate 1-0.1%, hydrALAZINE, labetalol, naloxone, ondansetron, oxycodone, promethazine (PHENERGAN) IVPB    Review of patient's allergies indicates:   Allergen Reactions    Ciprofloxacin Other (See Comments)     Nausea and joint pain      Epinephrine Other (See Comments)     Carcinoid patient    Levaquin [levofloxacin] Other (See Comments)     Nausea and joint pain    Morphine Nausea And Vomiting    Ciprofloxacin hcl     Demerol [meperidine]     Hydromorphone Nausea Only    Quinolones     Dilaudid [hydromorphone (bulk)] Nausea Only       Review of Systems  Denies headache, fever, chills, cp, sob, n/v, diarrhea, dysuria, and lower ext swelling      OBJECTIVE:     Vital Signs (Most Recent)  Temp: 98.7 °F (37.1 °C) (06/25/17 0445)  Pulse: 66 (06/25/17 0445)  Resp: 18 (06/25/17 0445)  BP: (!) 87/47 (06/25/17 0445)  SpO2: 96 % (06/25/17 0500)    Vital Signs Range (Last 24H):  Temp:  [98 °F (36.7 °C)-99.2 °F (37.3 °C)]   Pulse:  [66-82]   Resp:  [18-20]   BP: ()/(47-71)   SpO2:  [96 %-99 %]     I & O (Last 24H):    Intake/Output Summary (Last 24 hours) at 06/25/17 1854  Last data filed at 06/25/17 0600   Gross per 24 hour   Intake             1375 ml   Output              1690 ml   Net             -315 ml     Physical Exam:  .[facility]      Laboratory:  CBC:     Recent Labs  Lab 06/24/17  0524   WBC 6.38   RBC 3.08*   HGB 9.5*   HCT 28.1*      MCV 91   MCH 30.8   MCHC 33.8     CMP:     Recent Labs  Lab 06/24/17  0524      CALCIUM 8.7   ALBUMIN 2.6*   PROT 6.0      K 4.1   CO2 25      BUN 5*   CREATININE 0.7   ALKPHOS 206*   *   AST 55*   BILITOT 0.7           ASSESSMENT/PLAN:     Pt with malignant carcinoid of the liver s/p liver resection    - PT/OT  - monitor for BM  - 15cc out TIMA drain  - trial toradol yest with some pain relief, pt like to try percocet today though aware of adverse effects    Demian Ta MD  PGY-2 FM  8:21 AM

## 2017-06-25 NOTE — PLAN OF CARE
Problem: Patient Care Overview  Goal: Plan of Care Review  Outcome: Ongoing (interventions implemented as appropriate)  RA SAT 98%; no distress noted.

## 2017-06-25 NOTE — PLAN OF CARE
Problem: Patient Care Overview  Goal: Plan of Care Review  Patient remained AAOx4 for duration of shift. She is in NAD and has only c/o pain rated at 1/10 that was relieved by scheduled Toradol. TIMA device draining serosanguinous fluid amount to be recorded in Flowsheet. Midline incision open to air and is clean, dry, and intact. Patient resting in bed with eye closed, no s/s of distress noted. Will continue to monitor.

## 2017-06-26 ENCOUNTER — TELEPHONE (OUTPATIENT)
Dept: NEUROLOGY | Facility: HOSPITAL | Age: 57
End: 2017-06-26

## 2017-06-26 ENCOUNTER — NURSE TRIAGE (OUTPATIENT)
Dept: ADMINISTRATIVE | Facility: CLINIC | Age: 57
End: 2017-06-26

## 2017-06-26 NOTE — TELEPHONE ENCOUNTER
"    Reason for Disposition   Last bowel movement (BM) > 4 days ago    Protocols used: ST CONSTIPATION-A-[place]    Patient was referred for triage by Naye Hammer LPN following a "transition of care" call".  Patient is a recent discharge from the hospital for Metastatic malignant carcinoid tumor to liver.      Patient stated she she has not had a normal BM in > 4 days.  She was given a Dulcolax suppository before she left the hospital with no results.  She has since had a 1/2 bottle of Mag Citrate today with no results.  When I asked about her food intake, she has only had about a 1/2 of an avocado for lunch and about a cup of soup last evening.  Patient stated that about 30 minutes after she eats she starts having abdominal pain.      Message being sent to Dr. Arenas's nurse to call patient to see if he needs to see her today in his clinic.   "

## 2017-06-26 NOTE — TELEPHONE ENCOUNTER
----- Message from Naye Rivas sent at 6/26/2017  2:23 PM CDT -----  Contact: Patient  JPJOANN- Patient is calling regarding her lab work that is not released. Patient needs her lab values. Patients contact number [phone]

## 2017-06-26 NOTE — TELEPHONE ENCOUNTER
"Returned pts call. Pt with no BMx4 days. Pt actively using enema to find relief. Pt vomiting at times due to feeling "too full" advised pt that if no results with enema, proceed to the ER. Pt as f/u appt with Dr. Hong tomorrow. Pt verbalizes understanding.   "

## 2017-06-27 ENCOUNTER — OFFICE VISIT (OUTPATIENT)
Dept: NEUROLOGY | Facility: HOSPITAL | Age: 57
End: 2017-06-27
Attending: SURGERY
Payer: MEDICARE

## 2017-06-27 VITALS
TEMPERATURE: 98 F | HEIGHT: 63 IN | BODY MASS INDEX: 26.22 KG/M2 | SYSTOLIC BLOOD PRESSURE: 115 MMHG | HEART RATE: 68 BPM | WEIGHT: 148 LBS | DIASTOLIC BLOOD PRESSURE: 78 MMHG

## 2017-06-27 DIAGNOSIS — C7B.02 METASTATIC MALIGNANT CARCINOID TUMOR TO LIVER: ICD-10-CM

## 2017-06-27 DIAGNOSIS — C7B.01 SECONDARY CARCINOID TUMOR OF DISTANT LYMPH NODES: Primary | ICD-10-CM

## 2017-06-27 PROCEDURE — 99214 OFFICE O/P EST MOD 30 MIN: CPT | Performed by: SURGERY

## 2017-06-27 NOTE — PROGRESS NOTES
2 weeks s/p liver debulking MABEL.  C/o post prandial epigastric pain after eating avocado.    Afebrile.  Incision healing well.    Taking Tylenol alternating with ibuprofen.    Recommend stopping ibuprofen adding Prilosec 40 daily, Maalox +1 ounce before meals and at bedtime.  Given prescription for Ultram 50 every 6 hours when necessary pain may take one half tab to 1 tab as needed.    RTC if not improved otherwise keep three-month follow-up appointment with restaging CT MRI.  Repeat gallium scan in 6 months.  Tumor markers in 3 months

## 2017-07-12 ENCOUNTER — TELEPHONE (OUTPATIENT)
Dept: NEUROLOGY | Facility: HOSPITAL | Age: 57
End: 2017-07-12

## 2017-07-12 NOTE — TELEPHONE ENCOUNTER
----- Message from Juli Yates sent at 7/12/2017  1:51 PM CDT -----  Contact: Patient  JPB- Patient wants to speak to the nurse about the pathology report. Call back number is [phone].

## 2017-07-27 ENCOUNTER — TELEPHONE (OUTPATIENT)
Dept: NEUROLOGY | Facility: HOSPITAL | Age: 57
End: 2017-07-27

## 2017-07-27 NOTE — TELEPHONE ENCOUNTER
----- Message from Naye Rivas sent at 7/27/2017 12:18 PM CDT -----  Contact: Patient  JPJOANN- Patient would like to know how long does the stitches to dissolve. Patients contact number [phone]

## 2017-07-27 NOTE — TELEPHONE ENCOUNTER
Pt not having any complications, wanting to know how long it takes for stitches to heal.  Pt instructed can take anywhere from 2 to 4 months

## 2017-08-30 LAB
EXT 24 HR UR METANEPHRINE: ABNORMAL
EXT 24 HR UR NORMETANEPHRINE: ABNORMAL
EXT 24 HR UR NORMETANEPHRINE: ABNORMAL
EXT 25 HYDROXY VIT D2: ABNORMAL
EXT 25 HYDROXY VIT D3: ABNORMAL
EXT 5 HIAA 24 HR URINE: ABNORMAL
EXT 5 HIAA BLOOD: 31 NG/ML (ref 0–22)
EXT ACTH: ABNORMAL
EXT AFP: ABNORMAL
EXT ALBUMIN: 3.7 (ref 3.4–5)
EXT ALKALINE PHOSPHATASE: 105 (ref 48–116)
EXT ALT: 36 (ref 14–59)
EXT AMYLASE: ABNORMAL
EXT ANTI ISLET CELL AB: ABNORMAL
EXT ANTI PARIETAL CELL AB: ABNORMAL
EXT ANTI THYROID AB: ABNORMAL
EXT AST: 23 (ref 15–37)
EXT BILIRUBIN DIRECT: ABNORMAL MG/DL
EXT BILIRUBIN TOTAL: 0.5 (ref 0.2–1)
EXT BK VIRUS DNA QN PCR: ABNORMAL
EXT BUN: 12 (ref 7–18)
EXT C PEPTIDE: ABNORMAL
EXT CA 125: ABNORMAL
EXT CA 19-9: ABNORMAL
EXT CA 27-29: ABNORMAL
EXT CALCITONIN: ABNORMAL
EXT CALCIUM: 9.4 (ref 8.5–10)
EXT CEA: ABNORMAL
EXT CHLORIDE: 106 (ref 98–107)
EXT CHOLESTEROL: ABNORMAL
EXT CHROMOGRANIN A: 87 NG/ML (ref 0–95)
EXT CO2: 26 (ref 21–32)
EXT CREATININE UA: ABNORMAL
EXT CREATININE: 0.8 MG/DL (ref 0.6–1.3)
EXT CYCLOSPORONE LEVEL: ABNORMAL
EXT DOPAMINE: ABNORMAL
EXT EBV DNA BY PCR: ABNORMAL
EXT EPINEPHRINE: ABNORMAL
EXT FOLATE: ABNORMAL
EXT FREE T3: ABNORMAL
EXT FREE T4: ABNORMAL
EXT FSH: ABNORMAL
EXT GASTRIN RELEASING PEPTIDE: ABNORMAL
EXT GASTRIN RELEASING PEPTIDE: ABNORMAL
EXT GASTRIN: ABNORMAL
EXT GGT: ABNORMAL
EXT GHRELIN: ABNORMAL
EXT GLUCAGON: ABNORMAL
EXT GLUCOSE: 77 (ref 74–106)
EXT GROWTH HORMONE: ABNORMAL
EXT HCV RNA QUANT PCR: ABNORMAL
EXT HDL: ABNORMAL
EXT HEMATOCRIT: 39.9 (ref 37–47)
EXT HEMOGLOBIN A1C: ABNORMAL
EXT HEMOGLOBIN: 13.4 (ref 12–16)
EXT HISTAMINE 24 HR URINE: ABNORMAL
EXT HISTAMINE: ABNORMAL
EXT IGF-1: ABNORMAL
EXT IMMUNKNOW (NON-STIMULATED): ABNORMAL
EXT IMMUNKNOW (STIMULATED): ABNORMAL
EXT INR: ABNORMAL
EXT INSULIN: ABNORMAL
EXT LANREOTIDE LEVEL: ABNORMAL
EXT LDH, TOTAL: ABNORMAL
EXT LDL CHOLESTEROL: ABNORMAL
EXT LIPASE: ABNORMAL
EXT MAGNESIUM: ABNORMAL
EXT METANEPHRINE FREE PLASMA: ABNORMAL
EXT MOTILIN: ABNORMAL
EXT NEUROKININ A CAMB: ABNORMAL
EXT NEUROKININ A ISI: 13 PG/ML (ref 0–40)
EXT NEUROTENSIN: ABNORMAL
EXT NOREPINEPHRINE: ABNORMAL
EXT NORMETANEPHRINE: ABNORMAL
EXT NSE: ABNORMAL
EXT OCTREOTIDE LEVEL: ABNORMAL
EXT PANCREASTATIN CAMB: ABNORMAL
EXT PANCREASTATIN ISI: 77 PG/ML (ref 10–135)
EXT PANCREATIC POLYPEPTIDE: ABNORMAL
EXT PHOSPHORUS: ABNORMAL
EXT PLATELETS: 258 (ref 130–400)
EXT POTASSIUM: 3.8 (ref 3.5–5.1)
EXT PROGRAF LEVEL: ABNORMAL
EXT PROLACTIN: ABNORMAL
EXT PROTEIN TOTAL: 7.7 (ref 6.4–8.2)
EXT PROTEIN UA: ABNORMAL
EXT PT: ABNORMAL
EXT PTH, INTACT: ABNORMAL
EXT PTT: ABNORMAL
EXT RAPAMUNE LEVEL: ABNORMAL
EXT SEROTONIN: 397 NG/ML (ref 50–200)
EXT SODIUM: 141 MMOL/L (ref 136–145)
EXT SOMATOSTATIN: ABNORMAL
EXT SUBSTANCE P: ABNORMAL
EXT TRIGLYCERIDES: ABNORMAL
EXT TRYPTASE: ABNORMAL
EXT TSH: ABNORMAL
EXT URIC ACID: ABNORMAL
EXT URINE AMYLASE U/HR: ABNORMAL
EXT URINE AMYLASE U/L: ABNORMAL
EXT VASOACTIVE INTESTINAL POLYPEPTIDE: ABNORMAL
EXT VITAMIN B12: ABNORMAL
EXT VMA 24 HR URINE: ABNORMAL
EXT WBC: 3.4 (ref 4.8–10.8)
NEURON SPECIFIC ENOLASE: ABNORMAL

## 2017-09-14 ENCOUNTER — TELEPHONE (OUTPATIENT)
Dept: NEUROLOGY | Facility: HOSPITAL | Age: 57
End: 2017-09-14

## 2017-09-14 DIAGNOSIS — C7A.012 MALIGNANT CARCINOID TUMOR OF THE ILEUM: Primary | ICD-10-CM

## 2017-09-14 NOTE — TELEPHONE ENCOUNTER
----- Message from Juli Yates sent at 9/11/2017  9:59 AM CDT -----  Contact: Patient  EAW- Gallium and an MRI instead of a CT. Please call the patient back at [phone].

## 2017-09-14 NOTE — TELEPHONE ENCOUNTER
Returned call to patient and LVM.  Researched the chart and Dr Hong stated that he did want her to get a gallium scan in 6 months but also stated that he wanted to restage her with a CT and MRI. Ordered the gallium scan and scheduled it for 7 AM at the [facility] on Tuesday 9/26/17 before her MRI and CT here at [facility] and told her to call back to clarify.

## 2017-09-15 ENCOUNTER — TELEPHONE (OUTPATIENT)
Dept: NEUROLOGY | Facility: HOSPITAL | Age: 57
End: 2017-09-15

## 2017-09-15 NOTE — TELEPHONE ENCOUNTER
----- Message from Juli Yates sent at 9/15/2017  3:18 PM CDT -----  Contact: Patient  EAW- Patient is returning the nurses call. Call back number is [phone].

## 2017-09-26 ENCOUNTER — HOSPITAL ENCOUNTER (OUTPATIENT)
Dept: RADIOLOGY | Facility: HOSPITAL | Age: 57
Discharge: HOME | End: 2017-09-26
Attending: SURGERY
Payer: MEDICARE

## 2017-09-26 DIAGNOSIS — C7B.01 SECONDARY CARCINOID TUMOR OF DISTANT LYMPH NODES: ICD-10-CM

## 2017-09-26 DIAGNOSIS — C7A.012 MALIGNANT CARCINOID TUMOR OF THE ILEUM: ICD-10-CM

## 2017-09-26 DIAGNOSIS — C7B.02 METASTATIC MALIGNANT CARCINOID TUMOR TO LIVER: ICD-10-CM

## 2017-09-26 PROCEDURE — 25500020 PHARM REV CODE 255: Performed by: SURGERY

## 2017-09-26 PROCEDURE — 74177 CT ABD & PELVIS W/CONTRAST: CPT | Mod: 26,,, | Performed by: RADIOLOGY

## 2017-09-26 PROCEDURE — 74183 MRI ABD W/O CNTR FLWD CNTR: CPT | Mod: TC

## 2017-09-26 PROCEDURE — A9585 GADOBUTROL INJECTION: HCPCS | Performed by: SURGERY

## 2017-09-26 PROCEDURE — 74183 MRI ABD W/O CNTR FLWD CNTR: CPT | Mod: 26,,, | Performed by: RADIOLOGY

## 2017-09-26 PROCEDURE — 74177 CT ABD & PELVIS W/CONTRAST: CPT | Mod: TC

## 2017-09-26 RX ORDER — GADOBUTROL 604.72 MG/ML
10 INJECTION INTRAVENOUS
Status: COMPLETED | OUTPATIENT
Start: 2017-09-26 | End: 2017-09-26

## 2017-09-26 RX ADMIN — GADOBUTROL 10 ML: 604.72 INJECTION INTRAVENOUS at 01:09

## 2017-09-26 RX ADMIN — IOHEXOL 30 ML: 350 INJECTION, SOLUTION INTRAVENOUS at 09:09

## 2017-09-26 RX ADMIN — IOHEXOL 75 ML: 350 INJECTION, SOLUTION INTRAVENOUS at 11:09

## 2017-09-27 ENCOUNTER — OFFICE VISIT (OUTPATIENT)
Dept: NEUROLOGY | Facility: HOSPITAL | Age: 57
End: 2017-09-27
Attending: SURGERY
Payer: MEDICARE

## 2017-09-27 VITALS
HEIGHT: 63 IN | DIASTOLIC BLOOD PRESSURE: 74 MMHG | TEMPERATURE: 97 F | SYSTOLIC BLOOD PRESSURE: 112 MMHG | HEART RATE: 67 BPM | WEIGHT: 149.25 LBS | BODY MASS INDEX: 26.45 KG/M2

## 2017-09-27 DIAGNOSIS — C7B.02 METASTATIC MALIGNANT CARCINOID TUMOR TO LIVER: ICD-10-CM

## 2017-09-27 DIAGNOSIS — C7A.012 MALIGNANT CARCINOID TUMOR OF ILEUM: Primary | ICD-10-CM

## 2017-09-27 DIAGNOSIS — C7B.8 SECONDARY NEUROENDOCRINE TUMOR OF DISTANT LYMPH NODES: ICD-10-CM

## 2017-09-27 PROCEDURE — 99214 OFFICE O/P EST MOD 30 MIN: CPT | Performed by: SURGERY

## 2017-09-27 RX ORDER — TETANUS TOXOID, REDUCED DIPHTHERIA TOXOID AND ACELLULAR PERTUSSIS VACCINE, ADSORBED 5; 2.5; 8; 8; 2.5 [IU]/.5ML; [IU]/.5ML; UG/.5ML; UG/.5ML; UG/.5ML
SUSPENSION INTRAMUSCULAR
COMMUNITY
Start: 2017-09-11

## 2017-09-27 NOTE — LETTER
September 27, 2017        Marc Domingo MD  [address]   [address]  [facility] [address]       NOLANETS: [facility]  A collaboration between [facility] and [facility] [address]  [address]  [place] [address]  Phone: [phone]  Fax: [phone]        CIARA Hong M.D., FACS  Kun Alcazar M.D.  Jordan Pizano M.D.   Cailin Denson M.D., FACS  DO Thad Maher M.D., FACS   Patient: Krysten Muhammad   MR Number: [id number]   YOB: 1960   Date of Visit: 9/27/2017     Dear Dr. Domingo    Thank you for the kind referral of Krysten Muhammad. We saw this patient on 9/27/2017, and a copy of our clinic note is enclosed. We certainly appreciate the opportunity to participate in your patient's care.     If you have any questions or wish to discuss your patient further, please do not hesitate to contact us at [phone].       Kindest personal regards,          Thad White MD, FACS  The Ram[employer] Professor of Surgery & Neurosciences  [facility], Dept. Of Surgery  [address] [place] [address], [address]  [place] [address] [phone]

## 2017-09-27 NOTE — PROGRESS NOTES
"NOLANETS:  [facility]  A collaboration between [facility] and [facility]    PATIENT: Krysten Muhammad  MRN: [id number]  DATE: 9/27/2017    Vitals:    09/27/17 1050   BP: 112/74   Pulse: 67   Temp: 96.7 °F (35.9 °C)   TempSrc: Oral   Weight: 67.7 kg (149 lb 4 oz)   Height: 5' 3" (1.6 m)      Last 2 Weight Measurements:   Wt Readings from Last 2 Encounters:   09/27/17 67.7 kg (149 lb 4 oz)   06/27/17 67.1 kg (148 lb)     BMI: Body mass index is 26.44 kg/m².    Karnofsky Score    Karnofsky Score:  100% - Normal, No Complaints, No Evidence of Disease       Diagnosis:   1. Malignant carcinoid tumor of ileum    2. Secondary neuroendocrine tumor of distant lymph nodes    3. 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 Hong    SBR, liver r/s, gall bladder  07/2013    Ki 67- 2%    SBR, lymph node r/s  10/2012    found carcinoid with colon obstruction    URETHRAL SLING  2012       Review of patient's allergies indicates:   Allergen Reactions    Ciprofloxacin Other (See Comments)     Nausea and joint pain      Epinephrine Other (See Comments)     Carcinoid patient    Levaquin [levofloxacin] Other (See Comments) and Nausea Only     & joint pain  Nausea " and joint pain    Morphine Nausea And Vomiting    Ciprofloxacin hcl     Demerol [meperidine]     Hydromorphone Nausea Only    Hydromorphone hcl Nausea Only    Quinolones     Dilaudid [hydromorphone (bulk)] Nausea Only       Current Outpatient Prescriptions   Medication Sig Dispense Refill    BOOSTRIX TDAP 2.5-8-5 Lf-mcg-Lf/0.5mL Syrg injection       estradiol (ESTRACE) 1 MG tablet Take 1 mg by mouth once daily.      iron polysac-iron heme polypep 28 mg Tab Take by mouth.      MYRBETRIQ 50 mg Tb24       progesterone (PROMETRIUM) 200 MG capsule Take 400 mg by mouth every evening.       senna-docusate 8.6-50 mg (PERICOLACE) 8.6-50 mg per tablet Take 2 tablets by mouth 2 (two) times daily.      thyroid, pork, (ARMOUR THYROID) 90 mg Tab Take 90 mg by mouth once daily.       No current facility-administered medications for this visit.        Review of Systems     Number of Days per Week Number per Day   DIARRHEA 0    BRISTOL STOOL SCALE RATING     FLUSHING 0    WHEEZING 0      WEIGHT GAIN/LOSS stable   ENERGY RATING (0-10) 10      Physical Exam deferred.       Pathology Data:  SPECIMEN  1) Liver segment #5.  2) Liver segment #8.  3) Liver segment #8 posterior.  FINAL PATHOLOGIC DIAGNOSIS  1. Liver segment #5:  -Steatosis: 25-30%  -Patchy hepatocellular bile stasis and damage with mild acute inflammation  -No cirrhosis  2. Liver segment #8:  -Metastatic well-differentiated neuroendocrine tumor, intermediate grade (WHO grade 2)  -Mitoses: 5 per 10 high power fields  -Necrosis: Occasional apoptosis  -See microscopic examination for biomarker results  3. Liver segment #8 posterior:  -Dense stromal fibrosis, hyalinization and fibrin  -No evidence of malignancy  -Background hepatocytes with reactive change and patchy hepatocellular bile stasis  -Mild micro-and macrosteatosis: 2-5% no cirrhosis    Microscopic Examination  2. NET PANEL: BLOCK 2B  -Ki-67: Positive; 12.9 % cells staining (96 of 744 tumor  cells)  -Chromogranin: Positive; 1+: 75 % cells; 2+: 25% cells  -Synaptophysin: Positive; 3+: 100 % cells  -CD31: Positive; 20 vessels per HPF  -Factor VIII: Positive; 18 vessels per HPF  Immunostains performed with appropriate positive and negative controls.        Laboratory Data:    Neuroendocrine Labs Latest Ref Rng & Units 8/30/2017   EXT 5 HIAA BLOOD 0 - 22 ng/ml 15   EXT SEROTONIN 56.0 - 244.0 ng/ml    EXT CHROMOGRANIN A 0.0 - 95.0 ng/ml    EXT PANCREASTATIN JERE 10 - 135 pg/ml 77   EXT NEUROKININ A JERE 0 - 40 pg/ml 13   VITAMIN B12 180 - 914 ng/L    EXT VITAMIN B12 211.0 - 911.0    EXT FREE T4 4.5 - 10.9    EXT FREE T3 22.5 - 37.0    EXT TSH 0.55 - 4.78    EXT 25 HYDROXY VIT D2     WBC 3.90 - 12.70 K/uL    EXT WBC 4.8 - 10.8 3.4 (A)   HGB 12.0 - 16.0 g/dL    EXT HGB 12 - 16 13.4   HCT 37.0 - 48.5 %    EXT HCT 37 - 47 39.9   PLATLETS 150 - 350 K/uL    EXT PLATLETS 130 - 400 258   GLUCOSE 70 - 110 mg/dL    EXT GLUCOSE 74 - 106 77   BUN 6 - 20 mg/dL    EXT BUN 7 - 18 12   CREATININE 0.5 - 1.4 mg/dL    EXT CREATININE 0.6 - 1.3 mg/dL 0.8    - 145 mmol/L    EXT  - 145 mmol/L 141   K 3.5 - 5.1 mmol/L    EXT K 3.5 - 5.1 3.8   CHLORIDE 95 - 110 mmol/L    EXT CHLORIDE 98 - 107 106   CO2 23 - 29 mmol/L    EXT CO2 21 - 32 26   CALCIUM 8.7 - 10.5 mg/dL    EXT CALCIUM 8.5 - 10.0 9.4   PROTEIN, TOTAL 6.0 - 8.4 g/dL    EXT PROTEIN, TOTAL 6.4 - 8.2 7.7   PHOSPHORUS 2.7 - 4.5 mg/dL    ALBUMIN 3.5 - 5.2 g/dL    EXT ALBUMIN 3.4 - 5.0 3.7   TOTAL BILIRUBIN 0.1 - 1.0 mg/dL    EXT TOTAL BILIRUBIN 0.2 - 1.0 0.5   ALK PHOSPHATASE 55 - 135 U/L    EXT ALK PHOSPHATASE 48 - 116 105   SGOT (AST) 10 - 40 U/L    EXT SGOT (AST) 15 - 37 23   SGPT (ALT) 10 - 44 U/L    EXT ALT 14 - 59 36   EXT LDH 81.0 - 234.0    EXT TRIGLYCERIDES 35.0 - 160.0    EXT CHOLESETEROL 140.0 - 210.0    EXT HDL     EXT LDL 0.0 - 100.0    MG 1.6 - 2.6 mg/dL    EXT MG 1.8 - 2.4    Weight         Radiology Data:  There is no evidence of focal enhancement.  No  biliary dilatation is seen.  The gallbladder is absent.    The pancreas is normal in size and signal.  Pancreatic duct is of normal caliber.    The spleen demonstrates nothing unusual. Kidneys and adrenal glands have a normal MR appearance.    The aorta tapers distally.  Bowel is not well evaluated.  There is no free fluid.  There is postoperative change of the anterior abdominal wall.   Impression       1. Postsurgical change consistent with multiple hepatic resections.  Three focal liver lesions are identified with arterial phase enhancement and washout concerning for metastatic disease.  Two of these lie in segment 6 and appear stable.  One lesion in segment 3 corresponds to finding on earlier CT scan and appears new from the 5/29/17 exam.     Multiple arterial phase enhancing lesions with delayed washout are present.    The largest lies in segment 6 and measures 2.1 cm (3:27), not significantly changed.  This was previously described as index lesion #3.  A 2nd enhancing lesion in segment 6 measures 1 cm (3:28) also not significantly changed allowing for differences in contrast bolus timing.  Two new enhancing liver lesions are identified on today's exam, the larger of lies in segment 3 and measures 8 mm (3:21)    There is no evidence of biliary dilatation.  The gallbladder is absent.  The portal vein is patent.    The pancreas, spleen, and adrenal glands have a normal appearance.    Stomach and small bowel are non-dilated.  There is no evidence of colonic thickening or obstruction.     The kidneys concentrate and excrete contrast appropriately.  The aorta tapers distally.    Bladder and uterus have a normal appearance.    There is no free air or free fluid in the abdomen or pelvis.    No osseous abnormalities are seen.   Impression       1. Interval resection of multiple liver lesions including largest two index lesions described in the most recent CT report.  2.  No significant change in enhancing hepatic index  lesion 3, located in segment 6.  3. Several new enhancing liver lesions measuring up to 8 mm in diameter likely reflecting additional metastases and worrisome for disease progression.     CONCLUSIONS --echocardiogram    1 - Normal left ventricular systolic function (EF 60-65%).     2 - Normal left ventricular diastolic function.     3 - Normal right ventricular systolic function .         Impression:  1. Stable--post op changes       Plan:restage in 6 months       Labs: Markers: 3 month intervals            Other: 12 month intervals--echo every may    Scans: 6 month intervals    Return to Clinic:6 month intervals    Orders placed this visit:   Orders Placed This Encounter   Procedures    NM PET Ga68 Dotatate Whole Body    MRI Abdomen W WO Contrast    5-HIAA Plasma (Neuroendocrine)    Serotonin (Neuroendo)    Pancreastatin    Neurokinin A    CBC auto differential    Comprehensive metabolic panel        25 Minutes Face-to-Face; Counseling/Coordination of Care > 50 percent of Visit     Thad White MD, FACS  The Ramón Campos Professor of Surgery, [facility]  [address], [address]  [place]  [address]  Cell [phone]  ph. [phone]; [phone]  fax. [phone]  [email or web address]

## 2017-09-27 NOTE — PATIENT INSTRUCTIONS
Labs every 3 months-orders given to patient    MRI and gallium scan prior to returning to clinic-call [phone] to schedule    Echocardiogram yearly scheduled in April     Return to clinic in 6 months-appointment scheduled

## 2017-11-02 ENCOUNTER — TELEPHONE (OUTPATIENT)
Dept: NEUROLOGY | Facility: HOSPITAL | Age: 57
End: 2017-11-02

## 2017-11-02 NOTE — TELEPHONE ENCOUNTER
----- Message from Naye Rivas sent at 11/1/2017  1:12 PM CDT -----  Contact: Patient  EAW- Patient called needing the address to the [facility] to send her records. Patients call back number [phone]

## 2017-11-02 NOTE — TELEPHONE ENCOUNTER
Returned call to patient and discussed what she needed.  She is in a trial at [facility] [place] and needs records from here sent there.  As requested mailed reports and CD's of CT A/P with contrast from 5/29/17 and 9/26/17 and MRI with and with out contrast 9/26/17, operative note and pathology report from surgery dated 6/19/17 to Dr Americo Armstrong, [facility] [place]/[facility], [address], [facility] [address] [facility] [place] [address].

## 2018-04-23 LAB
EXT 24 HR UR METANEPHRINE: ABNORMAL
EXT 24 HR UR NORMETANEPHRINE: ABNORMAL
EXT 24 HR UR NORMETANEPHRINE: ABNORMAL
EXT 25 HYDROXY VIT D2: ABNORMAL
EXT 25 HYDROXY VIT D3: ABNORMAL
EXT 5 HIAA 24 HR URINE: ABNORMAL
EXT 5 HIAA BLOOD: ABNORMAL
EXT ACTH: ABNORMAL
EXT AFP: ABNORMAL
EXT ALBUMIN: 4.4 G/DL (ref 3.5–5.2)
EXT ALKALINE PHOSPHATASE: 84 U/L (ref 35–105)
EXT ALT: 32 U/L (ref 0–33)
EXT AMYLASE: ABNORMAL
EXT ANTI ISLET CELL AB: ABNORMAL
EXT ANTI PARIETAL CELL AB: ABNORMAL
EXT ANTI THYROID AB: ABNORMAL
EXT AST: 24 U/L (ref 0–32)
EXT BILIRUBIN DIRECT: <0.2 MG/DL (ref 0–0.3)
EXT BILIRUBIN TOTAL: 0.6 MG/DL (ref 0–1.2)
EXT BK VIRUS DNA QN PCR: ABNORMAL
EXT BUN: 8 MG/DL (ref 6–20)
EXT C PEPTIDE: ABNORMAL
EXT CA 125: ABNORMAL
EXT CA 19-9: ABNORMAL
EXT CA 27-29: ABNORMAL
EXT CALCITONIN: <5 PG/ML (ref 0–7.6)
EXT CALCIUM: 2.3 MMOL/L (ref 2.15–2.55)
EXT CEA: ABNORMAL
EXT CHLORIDE: 101 MMOL/L (ref 98–107)
EXT CHOLESTEROL: 191 MG/DL (ref 0–199)
EXT CHROMOGRANIN A: 91 NG/ML (ref 0–93)
EXT CO2: 24 MMOL/L (ref 22–29)
EXT CREATININE UA: ABNORMAL
EXT CREATININE: 0.83 MG/DL (ref 0.51–0.95)
EXT CYCLOSPORONE LEVEL: ABNORMAL
EXT DOPAMINE: ABNORMAL
EXT EBV DNA BY PCR: ABNORMAL
EXT EPINEPHRINE: ABNORMAL
EXT FOLATE: ABNORMAL
EXT FREE T3: ABNORMAL
EXT FREE T4: 1 NG/DL (ref 0.9–1.7)
EXT FSH: ABNORMAL
EXT GASTRIN RELEASING PEPTIDE: ABNORMAL
EXT GASTRIN RELEASING PEPTIDE: ABNORMAL
EXT GASTRIN: 18 PG/ML (ref 0–100)
EXT GGT: ABNORMAL
EXT GHRELIN: ABNORMAL
EXT GLUCAGON: ABNORMAL
EXT GLUCOSE: 94 MG/DL (ref 74–106)
EXT GROWTH HORMONE: ABNORMAL
EXT HCV RNA QUANT PCR: ABNORMAL
EXT HDL: 53 MG/DL
EXT HEMATOCRIT: 38.5 % (ref 34.1–44.9)
EXT HEMOGLOBIN A1C: ABNORMAL
EXT HEMOGLOBIN: 12.8 G/DL (ref 11.2–15.7)
EXT HISTAMINE 24 HR URINE: ABNORMAL
EXT HISTAMINE: ABNORMAL
EXT IGF-1: ABNORMAL
EXT IMMUNKNOW (NON-STIMULATED): ABNORMAL
EXT IMMUNKNOW (STIMULATED): ABNORMAL
EXT INR: 0.85
EXT INSULIN: ABNORMAL
EXT LANREOTIDE LEVEL: ABNORMAL
EXT LDH, TOTAL: ABNORMAL
EXT LDL CHOLESTEROL: 108 MG/DL (ref 0–100)
EXT LIPASE: ABNORMAL
EXT MAGNESIUM: ABNORMAL
EXT METANEPHRINE FREE PLASMA: ABNORMAL
EXT MOTILIN: ABNORMAL
EXT NEUROKININ A CAMB: ABNORMAL
EXT NEUROKININ A ISI: ABNORMAL
EXT NEUROTENSIN: ABNORMAL
EXT NOREPINEPHRINE: ABNORMAL
EXT NORMETANEPHRINE: ABNORMAL
EXT NSE: ABNORMAL
EXT OCTREOTIDE LEVEL: ABNORMAL
EXT PANCREASTATIN CAMB: ABNORMAL
EXT PANCREASTATIN ISI: ABNORMAL
EXT PANCREATIC POLYPEPTIDE: ABNORMAL
EXT PHOSPHORUS: ABNORMAL
EXT PLATELETS: 254 K/UL (ref 17–369)
EXT POTASSIUM: 3.8 MMOL/L (ref 3.4–5.1)
EXT PROGRAF LEVEL: ABNORMAL
EXT PROLACTIN: ABNORMAL
EXT PROTEIN TOTAL: 7 G/DL (ref 6.4–8.3)
EXT PROTEIN UA: ABNORMAL
EXT PT: 11.8 SEC (ref 11.6–15.2)
EXT PTH, INTACT: ABNORMAL
EXT PTT: 28.6 SEC (ref 25.3–37.6)
EXT RAPAMUNE LEVEL: ABNORMAL
EXT SEROTONIN: ABNORMAL
EXT SODIUM: 139 MMOL/L (ref 136–145)
EXT SOMATOSTATIN: ABNORMAL
EXT SUBSTANCE P: ABNORMAL
EXT TRIGLYCERIDES: 148 MG/DL (ref 0–149)
EXT TRYPTASE: ABNORMAL
EXT TSH: 2.11 MCIU/ML (ref 0.27–4.2)
EXT URIC ACID: ABNORMAL
EXT URINE AMYLASE U/HR: ABNORMAL
EXT URINE AMYLASE U/L: ABNORMAL
EXT VASOACTIVE INTESTINAL POLYPEPTIDE: ABNORMAL
EXT VITAMIN B12: ABNORMAL
EXT VMA 24 HR URINE: ABNORMAL
EXT WBC: 3.68 K/UL (ref 3.98–10.04)
NEURON SPECIFIC ENOLASE: ABNORMAL

## 2018-04-24 ENCOUNTER — TELEPHONE (OUTPATIENT)
Dept: NEUROLOGY | Facility: HOSPITAL | Age: 58
End: 2018-04-24

## 2018-04-24 NOTE — TELEPHONE ENCOUNTER
Phoned patient to discuss the recommendations from Dr Hong and Dr White regarding a percutaneous microwave liver ablation to be done by Dr May if she desires.  She is agreeable to have the procedure done here.  Message sent to the IR scheduling basket and Anamika high priority to contact the patient and get her set up for a clinic visit with Dr May and the procedure during the same trip as she is from [place] and she said she is not available until May as she is at [facility] being scanned for the study she is in.

## 2018-05-07 ENCOUNTER — TELEPHONE (OUTPATIENT)
Dept: NEUROLOGY | Facility: HOSPITAL | Age: 58
End: 2018-05-07

## 2018-05-07 NOTE — TELEPHONE ENCOUNTER
Spoke with pt, she would like for Dr. May to review her most current mri and NM scan to determine if a percutaneous ablation is still our recommendation.  msg sent to Dr. May and Anamika.  Explained to pt that Anamika will be contacting her for an appointment if he agrees that this is the plan.

## 2018-05-07 NOTE — TELEPHONE ENCOUNTER
----- Message from Juli Yates sent at 5/7/2018 10:10 AM CDT -----  Contact: Patient  FARA- Patient is waiting on a call back to be scheduled for Microwave ablation. Patient states she sent scans on CD the end of April. Patient would like a call back today at [phone]

## 2018-05-08 ENCOUNTER — TELEPHONE (OUTPATIENT)
Dept: NEUROLOGY | Facility: HOSPITAL | Age: 58
End: 2018-05-08

## 2018-05-08 NOTE — TELEPHONE ENCOUNTER
Forwarded to Ir scheduling again to contact patient to get the clinic appt set up and the procedure right after.

## 2018-05-08 NOTE — TELEPHONE ENCOUNTER
----- Message from Graciela Mondragon RN sent at 4/24/2018 11:51 AM CDT -----  Regarding: clinic appt and set up for perc microwave liver ablation  To All,  This patient needs to be set up for a clinic visit with Dr May to discuss perc microwave liver ablation on one day and have the procedure on the next day. She is from [facility] and she is not available until May.  Please contact her and set up the appointment with Dr May and let me know what the procedure is and I will order it in EPIC.  ThanksBhavya

## 2018-05-11 ENCOUNTER — TELEPHONE (OUTPATIENT)
Dept: NEUROLOGY | Facility: HOSPITAL | Age: 58
End: 2018-05-11

## 2018-05-11 NOTE — TELEPHONE ENCOUNTER
CIARA Hong MD sent to Anamika Mack; Lucian May MD; Kristel Quezada, RN             Please let her know that with hydrodissection, risk to kidney is negligible.      Previous Messages      ----- Message -----   From: Anamika Mack   Sent: 5/10/2018   4:18 PM   To: Lucian May MD, Kristel Quezada, RN, *   Subject: RE: MRN [id number]                                 Mrs. Bashir will like to take some time to think about both treatment options and will contact me when has decided which treatment she will like to proceed with . She is also open to going to the [facility] location if she can be seen sooner there versus [place].   ----- Message -----   From: Lucian May MD   Sent: 5/10/2018   1:29 PM   To: Kristel Quezada, RN, CIARA Hong MD, *   Subject: RE: MRN [id number]                                 We can do that but I feel comfortable ablating her lesions. What does she want to do? If she has a doctor there that can do it Im fine with that. She can consult with him/her. He should also address her other lesions.     Lucian       ----- Message -----   From: Anamika Mack   Sent: 5/10/2018   9:29 AM   To: Lucian May MD   Subject: MRN [id number]                                     Mrs. Bashir just inform me that she had another IR physician in [place] review her scans and he suggested that she have a bland embolization instead of microwave ablation. She said one of the tumor is close to the kidney and she don't want the ablation to damage it. She requested to have a clinic visit on one day and procedure the next day since she is coming from [place]. How would you like me to proceed with her case?   ----- Message -----   From: Lucian May MD   Sent: 5/8/2018  12:52 PM   To: Kristel Quezada, RN, CIARA Hong MD, *     PET shows 3 lesions. Hard to tell bc fused images are not available. We can start ablating the larger lesion and follow up the other two and ablate when larger.     Lucian   -----  Message -----   From: Kristel Quezada RN   Sent: 5/7/2018   5:06 PM   To: Lucian May MD, Anamika Mack, *     Krysten Muhammad -- sorry, not sure why it did not allow me to select chart.     Ladonna   ----- Message -----   From: Lucian May MD   Sent: 5/7/2018   4:58 PM   To: Kristel Quezada RN, Anamika Mack, *     Whos the pt?       ----- Message -----   From: Kristel Quezada RN   Sent: 5/7/2018   2:05 PM   To: Lucian May MD, Anamika Mack, *     Pt would like for Dr. May to review her most current mri and NM scan to determine if a percutaneous ablation is still our recommendation.  If so, please call pt to set up chanel and procedure.       Explained to pt that Anamika will be contacting her for an appointment if he agrees that this is the plan.     Thanks   Ladonna

## 2018-05-15 DIAGNOSIS — C7B.02 METASTATIC MALIGNANT CARCINOID TUMOR TO LIVER: Primary | ICD-10-CM

## 2018-05-21 ENCOUNTER — TELEPHONE (OUTPATIENT)
Dept: NEUROLOGY | Facility: HOSPITAL | Age: 58
End: 2018-05-21

## 2018-05-21 NOTE — TELEPHONE ENCOUNTER
----- Message -----  From: Anamika Mack  Sent: 5/16/2018  12:12 PM  To: Lucian May MD, Graciela Mondragon RN, #    Good afternoon,  Mrs. Muhammad called me to cancel all her appointments for next and she decided to have her procedure in [place]. She also wanted to know if she needed a biopsy before her procedure since her tumor is aggressive. If she need it please contact her to let her know so she can inform the physician there of the request. She wanted to stay home so she will not be overwhelm with what is going on while traveling.  Thanks

## 2018-05-21 NOTE — TELEPHONE ENCOUNTER
Spoke with pt, she rec a msg from a doctor here to get a bx if at all possibly while they are in there.  No further questions.

## 2021-11-22 NOTE — PLAN OF CARE
TN met with patient, daughter at bedside. Patient sleeping, daughter answered most of questions. Patient flew here from [place], [place]. Patient is independent, no HH or medical equipment at home. Patient lives by herself. Patient's daughter states her and her brother are staying in [place] while her mother is in the hospital. TN will continue to follow and assess discharge needs.    Follow-up With  Details  Why  Contact Info   Marc Domingo MD  On 7/3/2017  Previously booked per patient/daughter  [address]  [address]  [facility] [address]  [phone]      06/19/17 1611   Discharge Assessment   Assessment Type Discharge Planning Assessment   Confirmed/corrected address and phone number on facesheet? Yes   Assessment information obtained from? Other  (daughter Doris)   Communicated expected length of stay with patient/caregiver yes   Type of Healthcare Directive Received Living will   If Healthcare Directive is received, is it scanned into Epic? no (comment)   Prior to hospitilization cognitive status: Alert/Oriented   Prior to hospitalization functional status: Independent   Current cognitive status: Alert/Oriented   Current Functional Status: Needs Assistance   Arrived From home or self-care   Lives With alone   Able to Return to Prior Arrangements yes   Is patient able to care for self after discharge? Yes   How many people do you have in your home that can help with your care after discharge? 1   Who are your caregiver(s) and their phone number(s)? Doris [phone]   Patient's perception of discharge disposition home or selfcare   Readmission Within The Last 30 Days no previous admission in last 30 days   Patient currently being followed by outpatient case management? No   Patient currently receives home health services? No   Does the patient currently use HME? No   Patient currently receives private duty nursing? No   Patient currently receives any other outside agency services? No   Equipment  Currently Used at Home none   Do you have any problems affording any of your prescribed medications? No   Is the patient taking medications as prescribed? yes   Does the patient have transportation to healthcare appointments? Yes   Transportation Available car;family or friend will provide   On Dialysis? No   Are there any open cases? No   Discharge Plan A Home with family   Discharge Plan B Home with family;Home Health   Patient/Family In Agreement With Plan yes     Noris Marie RN  Transition Navigator  [phone]   22-Nov-2021 15:49 22-Nov-2021 15:48

## 2022-04-30 NOTE — DISCHARGE SUMMARY
OFFICE VISIT    Patient: Stuart Chacon   : 1967 MRN: [id number]    SUBJECTIVE:  Chief Complaint   Patient presents with   • Leg     dvt follow up     A 54 year old female is here for a follow-up of DVT of right leg.    Patient has given consent to record this visit for documentation in their clinical record.    HISTORY OF PRESENT ILLNESS:  Underwent left total hip surgery on 2022 and then it turned into fluid overload, and had cardiology involved. Was quite inactive post surgery and due to the complications. Has been sitting a lot with her legs elevated to improve swelling. Specifically was resting the right leg on a ledge of sorts, right in the area of the most pain. This pain in the calf worsened along with swelling and so she was evaluated and diagnosed with DVT in the distal popliteal vein and posterior tibial veins of the right lower extremity. Inquires if there is any other treatment option except blood thinners for blood clots. She was started on Xarelto yesterday. Took Xarelto 15 mg twice yesterday, and one tablet this morning. Right leg is still little stiff; however, the symptoms have reduced today.   She kept her right leg elevated since surgery. Has been doing stairs and physical therapy since the surgery; hence, does not feel that she is not active enough. She has not been going outside a lot due to the bad weather. Has a family history of blood clot in her father.   She had been sleeping on a recliner since the surgery and just recently went to a bed within the last week or so, and wonders if this caused the blood clot. She tried warm compresses last night, which helped for a while. Orthopedics has not recommended any weight restriction from hip surgery standpoint.   Does not take Aspirin. Takes regular vitamin daily. She plans to start work on Monday, and inquires if it is ok to resume. She works as a .    PAST MEDICAL HISTORY:  Past Medical History:   Diagnosis Date   • ADHD  [facility] General Surgery  Discharge Summary      Patient Name: Krysten Muhammad  MRN: [id number]  Admission Date: 6/19/2017  Hospital Length of Stay: 6 days  Discharge Date and Time: 06/25/2017  Attending Physician: No att. providers found   Discharging Provider: Jarett Alexander MD  Primary Care Provider: Marc Domingo MD    HPI:   Ms. 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 Postoperatively she had ileus from which she gradually recovered. She had PRBC transfused for anemia. She was managed with different pain meds as some of the narcotics were causing disorientation.   On that day of discharge she was stable, tolerating diet, labs were stable  Consults:     Significant Diagnostic Studies:     Pending Diagnostic Studies:     None        Final Active Diagnoses:    Diagnosis Date Noted POA    PRINCIPAL PROBLEM:  Metastatic malignant carcinoid tumor to liver [C7B.02] 05/29/2017 Yes    Intestinal adhesions [K66.0] 06/19/2017 Yes      Problems Resolved During this Admission:    Diagnosis Date Noted Date Resolved POA      Discharged Condition: stable    Disposition: Home or Self Care    Follow Up:  Follow-up Information     Marc Domingo MD On 7/3/2017.    Specialty:  Hematology and Oncology  Why:  Previously booked per patient/daughter  Contact information:  [address]  [address] [place] [id number] [phone]             Schedule an appointment as soon as possible for a visit in 6 weeks to follow up.               Patient  (attention deficit hyperactivity disorder)    • Bipolar 2 disorder (CMS/[facility]) 05/29/2018   • Borderline hypertension    • Cardiomyopathy (CMS/[facility])    • CHF (congestive heart failure) (CMS/[facility])    • Depressive disorder    • HLD (hyperlipidemia)    • Obesity    • Pseudotumor cerebri     secondary to medication   • Sinus tachycardia      MEDICATIONS:  Current Outpatient Medications   Medication Sig   • rivaroxaban (Xarelto) 15 MG Tab Take 1 tablet by mouth 2 times daily (with meals).   • guanFACINE (INTUNIV) 2 MG TABLET SR 24 HR Take 1 tablet by mouth daily.   • amphetamine-dextroamphetamine (Adderall XR) 20 MG 24 hr capsule Take 1 capsule by mouth daily.   • zolpidem (AMBIEN) 10 MG tablet Take 1 tablet by mouth nightly.   • carvedilol (COREG) 6.25 MG tablet Take 1 tablet by mouth 2 times daily (with meals).   • acetaminophen (TYLENOL) 500 MG tablet Take 1 to 2 tablets by mouth every 8 hours as needed for Pain.   • ketoconazole (NIZORAL) 2 % cream Apply topically daily.   • lisinopril (ZESTRIL) 5 MG tablet Take 1 tablet by mouth 2 times daily.   • cetirizine (ZYRTEC ALLERGY) 10 MG tablet Take 10 mg by mouth as needed.    • vitamin - therapeutic multivitamins w/minerals (CENTRUM SILVER,THERA-M) TABS Take 1 tablet by mouth daily.   • [START ON 5/19/2022] rivaroxaban (Xarelto) 20 MG Tab Take 1 tablet by mouth daily (with breakfast). Do not start before May 19, 2022.   • [START ON 5/6/2022] amphetamine-dextroamphetamine (Adderall XR) 20 MG 24 hr capsule Take 1 capsule by mouth daily. Do not start before May 6, 2022.   • furosemide (LASIX) 20 MG tablet Take 1 tablet by mouth daily.     No current facility-administered medications for this visit.     ALLERGIES:  ALLERGIES:   Allergen Reactions   • Naprosyn [Naproxen] ANAPHYLAXIS   • Nut - Multiple   (Food) HIVES, PRURITUS and SWELLING   • Adhesive   (Environmental) PRURITUS and ERYTHEMA     Foam tape     PAST SURGICAL HISTORY:  Past Surgical History:   Procedure Laterality  Instructions:     Diet general     Activity as tolerated     Call MD for:  temperature >100.4     Call MD for:  persistent nausea and vomiting or diarrhea     Call MD for:  severe uncontrolled pain     Call MD for:  redness, tenderness, or signs of infection (pain, swelling, redness, odor or green/yellow discharge around incision site)     Call MD for:  difficulty breathing or increased cough     Call MD for:  severe persistent headache     Call MD for:  worsening rash     Call MD for:  persistent dizziness, light-headedness, or visual disturbances     Call MD for:  increased confusion or weakness       Medications:  Reconciled Home Medications:   Discharge Medication List as of 6/25/2017 11:54 AM      START taking these medications    Details   senna-docusate 8.6-50 mg (PERICOLACE) 8.6-50 mg per tablet Take 2 tablets by mouth 2 (two) times daily., Starting Sun 6/25/2017, OTC      metoclopramide HCl (REGLAN) 10 MG tablet Take 1 tablet (10 mg total) by mouth 3 (three) times daily before meals., Starting Sun 6/25/2017, Until Wed 7/5/2017, Print      oxycodone-acetaminophen (PERCOCET)  mg per tablet Take 1 tablet by mouth every 6 (six) hours as needed for Pain., Starting Sun 6/25/2017, Until Fri 6/30/2017, Print         CONTINUE these medications which have NOT CHANGED    Details   estradiol (ESTRACE) 1 MG tablet Take 1 mg by mouth once daily., Historical Med      MYRBETRIQ 50 mg Tb24 Starting Wed 5/24/2017, Historical Med      progesterone (PROMETRIUM) 200 MG capsule Take 200 mg by mouth every evening., Until Discontinued, Historical Med      thyroid, pork, (ARMOUR THYROID) 90 mg Tab Take 90 mg by mouth once daily., Until Discontinued, Historical Med      trospium (SANCTURA) 20 mg Tab tablet Starting Tue 5/30/2017, Historical Med      erythromycin base (E-MYCIN) 500 MG tablet Take 1G at 10am, 1pm and 3pm the day prior to surgery, Normal      neomycin (MYCIFRADIN) 500 mg Tab Take 1G at 10am, 1pm and 3pm the day  Date   •  section, low transverse  2006   • Iud mirena  10/08/2014    removed 19   • Joint replacement Right 2021    Total Hip Replacement   • Knee scope,diagnostic     • Left heart cath,percutaneous  2012    Cardiac Cath   • Nasal septum surgery     • Pacemaker/defib/device - check/evaluation  2013   • Shoulder arthroscopy Left 2019    rotator cuff reconstruction, open long head of the biceps tenodesis and subacromial decompression performed by Dr. Sebastian Freeman     FAMILY HISTORY:  Family History   Problem Relation Age of Onset   • Heart disease Father 59   • Congestive Heart Failure Father         AICD may 2013   • ADHD/ADD Father    • Depression Father    • Seizure Disorder Mother      SOCIAL HISTORY:  Social History     Tobacco Use   Smoking Status Never Smoker   Smokeless Tobacco Never Used     Social History     Substance and Sexual Activity   Alcohol Use No     Review Of Systems:  As Per HPI.    OBJECTIVE:  Visit Vitals  /70   Pulse 77   Temp 97.9 °F (36.6 °C) (Temporal)   Ht 5' 5\" (1.651 m)   Wt 104 kg (229 lb 4.5 oz)   LMP  (LMP Unknown)   SpO2 99%   BMI 38.15 kg/m²     PHYSICAL EXAM  Constitutional: In no acute distress. Well developed.  Eyes: The conjunctiva is not injected. The sclera is anicteric.  Respiratory: Breathing is nonlabored.  Psychiatric: Oriented to time, place, and person. The mood was normal. The affect was normal. The memory was unimpaired.   Skin: Skin moisture and turgor normal.    DIAGNOSTIC STUDIES:  LAB RESULTS:  Hospital Outpatient Visit on 2022   Component Date Value Ref Range Status   • Left Ventricular Internal Dimensio* 2022 5.3  cm Corrected   • Left Internal Dimenson in Systole 2022 3.4  cm Corrected   • Interventricular Septum in End Vy* 2022 0.9  cm Corrected   • Left ventricle end diastolic poste* 2022 0.8  cm Corrected   • Right Ventricular Area Change (API* 2022 46.8  % Corrected   •  prior to surgery, Normal           Time spent on the discharge of patient: 20  minutes    Jarett Alexander MD  General Surgery  [facility]   Sinuses of Valsalva 04/07/2022 3.1  cm Corrected   • Ascending Aorta 04/07/2022 3.3  cm Corrected   • Ejection Fraction 04/07/2022 48.0  % Corrected   • AV Peak Velocity 04/07/2022 1.5  m/s Corrected   • MV Peak E Velocity 04/07/2022 0.9  m/s Corrected   • AV Peak Gradient 04/07/2022 8.6  mmHg Corrected   • E Wave Decelaration Time 04/07/2022 217.4  ms Corrected   • AV Mean Gradient 04/07/2022 4.5  mmHg Corrected   • MV E Tissue Andres Lat 04/07/2022 15.0  cm/s Corrected   • DOP Calc LVOT Peak Andres 04/07/2022 1.1  m/s Corrected   • MV E Tissue Andres Med 04/07/2022 9.0  cm/s Corrected   • LVOT VTI 04/07/2022 18.1  cm Corrected   • MV E Wave Andres/E Tissue Andres Med 04/07/2022 10.5   Corrected   • RV Tissue Doppler Free Wall Heart * 04/07/2022 0.1  m/s Corrected   • Tricuspid Valve Peak Regurgitation* 04/07/2022 1.9  m/s Corrected   • Aortic Valve Area 04/07/2022 2.4  cm2 Corrected   • TV Estimated Right Arterial Pressu* 04/07/2022 3.0  mmHg Corrected   • Est Right Vent Systolic Pressure b* 04/07/2022 17.4  mmHg Corrected   • PV Peak Velocity 04/07/2022 1.0  m/s Corrected   Walk In on 04/01/2022   Component Date Value Ref Range Status   • COLOR, URINALYSIS 04/01/2022 Yellow   Final   • APPEARANCE, URINALYSIS 04/01/2022 Clear   Final   • GLUCOSE, URINALYSIS 04/01/2022 Negative  Negative mg/dL Final   • BILIRUBIN, URINALYSIS 04/01/2022 Negative  Negative Final   • KETONES, URINALYSIS 04/01/2022 Trace (A) Negative mg/dL Final   • SPECIFIC GRAVITY, URINALYSIS 04/01/2022 1.025  1.005 - 1.030 Final   • OCCULT BLOOD, URINALYSIS 04/01/2022 Negative  Negative Final   • PH, URINALYSIS 04/01/2022 5.5  5.0 - 7.0 Final   • PROTEIN, URINALYSIS 04/01/2022 Negative  Negative mg/dL Final   • UROBILINOGEN, URINALYSIS 04/01/2022 0.2  0.2, 1.0 mg/dL Final   • NITRITE, URINALYSIS 04/01/2022 Negative  Negative Final   • LEUKOCYTE ESTERASE, URINALYSIS 04/01/2022 Negative  Negative Final   • SQUAMOUS EPITHELIAL, URINALYSIS 04/01/2022 1 to 5  None  Seen, 1 to 5 /hpf Final   • ERYTHROCYTES, URINALYSIS 04/01/2022 None Seen  None Seen, 1 to 2 /hpf Final   • LEUKOCYTES, URINALYSIS 04/01/2022 1 to 5  None Seen, 1 to 5 /hpf Final   • BACTERIA, URINALYSIS 04/01/2022 Large (A) None Seen /hpf Final   • HYALINE CASTS, URINALYSIS 04/01/2022 None Seen  None Seen, 1 to 5 /lpf Final   • AMORPHOUS MATERIAL 04/01/2022 Present   Final     ASSESSMENT AND PLAN:  This is a 54 year old female who presents with :  1. Acute deep vein thrombosis (DVT) of popliteal vein of right lower extremity (CMS/HCC)    2. Current use of long term anticoagulation      Orders Placed This Encounter   • rivaroxaban (Xarelto) 20 MG Tab     Plan:  Acute deep vein thrombosis (DVT) of popliteal vein of right lower extremity (CMS/HCC) / Current use of long term anticoagulation  Reviewed that recent surgery and not being as physically active since the surgery are likely to be the provoking factors for the blood clot.    Imaging reports reviewed with pertinent concerning findings explained in detail. Demonstrated the anatomy of distal popliteal vein and posterior tibial vein with a picture.   Reviewed the benefits and mechanism of action of blood thinners for deep vein thrombosis. Discussed that taking blood thinner is the only option of treatment at this time.   Continue Xarelto 15 mg twice daily as prescribed for 21 days.   Will then switch to Xarelto 20 mg once daily for maintenance therapy. Prescription provided to start on 05/19/2022.   Discussed that the anticoagulation will be possibly continued for six months to get the patient fully recovered.   Reviewed the likely course of treatment for anticoagulation with one blood clot which is presumably provoked is three to six months; and long term in unprovoked patients.    Encouraged staying physically active and discussed not having any restrictions from weight standpoint in regard to the blood clot.  Cautioned on using any anti-inflammatories while  being on anticoagulants. Advised to inform us before starting any new supplements in the future, to check if it does not increase the bleeding risk.    Discussed that it is ok to resume work on Monday. Supportive measures discussed including keeping her right leg elevated as able and avoiding pressure on the spot. Driving should be fine.   Advised to follow up in three months.     Refer to orders.  Medical compliance with plan discussed and risks of non-compliance reviewed.  Patient education completed on disease process, etiology & prognosis.  Proper usage and side effects of medications reviewed & discussed.  Patient understands and agrees with the plan.  Return to clinic as clinically indicated as discussed with patient who verbalized understanding of the plan and is in agreement with the plan.    Return for 3 mo fu anticoagulation/prior DVT fu.    I,  Dr. Mariya Dyer, have created a visit summary document based on the audio recording between Dr. Kiley Coombs DO and this patient for the physician to review, edit as needed, and authenticate.  Creation Date: 5/2/2022     I have reviewed and edited the visit summary above and attest that it is accurate.

## 2024-07-01 NOTE — PROGRESS NOTES
Patient  has some symptoms of fat malabsorption and flushing. She still would like to loose weight.      Patient is very active daily. She does avoid most foods that cause flushing.   BMI 27      Meal plan needs ~2200 to avoid weight loss after surgery     Goal stop unplanned weight loss as noted by scales. And reduce flushing as noted by patient recall.         Plan provided meal plan handouts  and education for ~ 2200 calories avoiding food sources such as high insoluble fibers and excess fat and concentrated sweets.  Discussed not encouraging any more weight loss prior to surgery or further treatment.   Discussed food preparation and foods easier to tolerate to include in diet and foods to avoid with increased symptoms of carcinoid syndrome.   Recommended pancreatic enzymes to take prior to meals to improve absorption of fats and calories in foods to stop weight loss.   Provided contact informatio for any questions between visits  45 minutes face to face   Writer called patient to follow-up after IR thyroid biopsy procedure on 6/28/24. Patient reports mild tenderness. Patient rates pain 3/10, took OTC meds on Friday but is now tolerable without. No bleeding noted per patient. Patient denies nausea, vomiting, fever or shortness of breath.  Patient instructed to call [phone] with further questions or concerns. Patient verbalizes understanding.

## 2025-03-25 NOTE — TELEPHONE ENCOUNTER
Roshan Ferrell is calling to request a refill on the following medication(s):    Medication Request:  Requested Prescriptions     Pending Prescriptions Disp Refills    amphetamine-dextroamphetamine (ADDERALL, 10MG,) 10 MG tablet 30 tablet 0     Sig: Take 1 tablet by mouth daily for 30 days. Max Daily Amount: 10 mg    amphetamine-dextroamphetamine (ADDERALL XR) 20 MG extended release capsule 30 capsule 0     Sig: Take 1 capsule by mouth daily for 30 days. Max Daily Amount: 20 mg       Last Visit Date (If Applicable):  1/31/2025    Next Visit Date:    Visit date not found               See previous telephone call message.

## (undated) DEVICE — SET DECANTER MEDICHOICE

## (undated) DEVICE — DRESSING TEGADERM 2 3/8 X 2.75

## (undated) DEVICE — Device

## (undated) DEVICE — SUT PROLENE 3-0 36 MHMH

## (undated) DEVICE — HEMOSTAT SURGICEL 4X8IN

## (undated) DEVICE — DRAPE INCISE IOBAN 2 23X23IN

## (undated) DEVICE — DEVICE COLLECT FOR COLLAGEN

## (undated) DEVICE — SEE MEDLINE ITEM 157125

## (undated) DEVICE — TIPS FLEXIBLE 45CM

## (undated) DEVICE — KIT POWDER ABSORBABLE GELATIN

## (undated) DEVICE — SUT PROLENE 2-0 36IN MH BLU

## (undated) DEVICE — SUT SILK 3-0 STRANDS 30IN

## (undated) DEVICE — CATH ALL PUR URTHL 20FR

## (undated) DEVICE — APPLIER LIGACLIP LG 13IN

## (undated) DEVICE — IRRISEPT

## (undated) DEVICE — SUT 6/0 4-24IN PROLENE

## (undated) DEVICE — SUT PROLENE 5-0 36IN C-1

## (undated) DEVICE — SUT MONOCRYL 3-0 PS-1

## (undated) DEVICE — SUT 4-0 12-30IN SILK

## (undated) DEVICE — SUT SILK 2-0 STRANDS 30IN

## (undated) DEVICE — SPONGE LAP 18X18 PREWASHED

## (undated) DEVICE — APPLIER LIGACLIP SM 9.38IN

## (undated) DEVICE — SUT 0 27IN CHROMIC GUT BP

## (undated) DEVICE — DRAPE FLUID WARMER ORS 44X44IN

## (undated) DEVICE — DRESSING AQUACEL SACRAL 9 X 9

## (undated) DEVICE — MANIFOLD 4 PORT

## (undated) DEVICE — SUT 4/0 27IN PDS II VIO MON

## (undated) DEVICE — SUT PROLENE 3-0 30SH

## (undated) DEVICE — GLOVE SURG BIOGEL LATEX SZ 7.5

## (undated) DEVICE — SUT 2 60IN PROLENE BL MONO

## (undated) DEVICE — SPONGE DERMA 8PLY 2X2

## (undated) DEVICE — PAD ELECTRODE

## (undated) DEVICE — SEALER LIGASURE OPEN 5MM 23CM

## (undated) DEVICE — VITAGEL SPRAY SET 5/PK

## (undated) DEVICE — HEMOSTAT VITAGEL RT 4.5

## (undated) DEVICE — CLIP LIGATION MEDIUM CLIPS 1

## (undated) DEVICE — HANDPIECE PHOTONSABER Y TIP

## (undated) DEVICE — SUT PROLENE 2-0 SH 36IN BLU

## (undated) DEVICE — SUT ETHILON 3-0 PS2 18 BLK

## (undated) DEVICE — SUPPORT ULNA NERVE PROTECTOR

## (undated) DEVICE — SUT SILK 0 STRANDS 30IN BLK

## (undated) DEVICE — ELECTRODE REM PLYHSV RETURN 9

## (undated) DEVICE — ADHESIVE DERMABOND ADVANCED